# Patient Record
Sex: FEMALE | Race: WHITE | NOT HISPANIC OR LATINO | Employment: FULL TIME | URBAN - NONMETROPOLITAN AREA
[De-identification: names, ages, dates, MRNs, and addresses within clinical notes are randomized per-mention and may not be internally consistent; named-entity substitution may affect disease eponyms.]

---

## 2022-02-20 ENCOUNTER — APPOINTMENT (EMERGENCY)
Dept: RADIOLOGY | Facility: HOSPITAL | Age: 47
End: 2022-02-20
Payer: COMMERCIAL

## 2022-02-20 ENCOUNTER — APPOINTMENT (EMERGENCY)
Dept: CT IMAGING | Facility: HOSPITAL | Age: 47
End: 2022-02-20
Payer: COMMERCIAL

## 2022-02-20 ENCOUNTER — HOSPITAL ENCOUNTER (OUTPATIENT)
Facility: HOSPITAL | Age: 47
Setting detail: OBSERVATION
Discharge: HOME | End: 2022-02-22
Attending: INTERNAL MEDICINE | Admitting: FAMILY MEDICINE
Payer: COMMERCIAL

## 2022-02-20 DIAGNOSIS — R42 VERTIGO: Primary | ICD-10-CM

## 2022-02-20 DIAGNOSIS — I16.0 HYPERTENSIVE URGENCY: ICD-10-CM

## 2022-02-20 LAB
ALBUMIN SERPL BCP-MCNC: 3.6 G/DL (ref 3.5–5)
ALP SERPL-CCNC: 61 U/L (ref 46–116)
ALT SERPL W P-5'-P-CCNC: 29 U/L (ref 12–78)
ANION GAP SERPL CALCULATED.3IONS-SCNC: 3 MMOL/L (ref 4–13)
AST SERPL W P-5'-P-CCNC: 11 U/L (ref 5–45)
BASOPHILS # BLD AUTO: 0.02 THOUSANDS/ΜL (ref 0–0.1)
BASOPHILS NFR BLD AUTO: 0 % (ref 0–1)
BILIRUB SERPL-MCNC: 0.27 MG/DL (ref 0.2–1)
BUN SERPL-MCNC: 23 MG/DL (ref 5–25)
CALCIUM SERPL-MCNC: 8.5 MG/DL (ref 8.3–10.1)
CARDIAC TROPONIN I PNL SERPL HS: <2 NG/L
CHLORIDE SERPL-SCNC: 101 MMOL/L (ref 100–108)
CO2 SERPL-SCNC: 27 MMOL/L (ref 21–32)
CREAT SERPL-MCNC: 0.92 MG/DL (ref 0.6–1.3)
EOSINOPHIL # BLD AUTO: 0.04 THOUSAND/ΜL (ref 0–0.61)
EOSINOPHIL NFR BLD AUTO: 0 % (ref 0–6)
ERYTHROCYTE [DISTWIDTH] IN BLOOD BY AUTOMATED COUNT: 13 % (ref 11.6–15.1)
GFR SERPL CREATININE-BSD FRML MDRD: 74 ML/MIN/1.73SQ M
GLUCOSE SERPL-MCNC: 129 MG/DL (ref 65–140)
HCG SERPL QL: NEGATIVE
HCT VFR BLD AUTO: 43.8 % (ref 34.8–46.1)
HGB BLD-MCNC: 14.5 G/DL (ref 11.5–15.4)
IMM GRANULOCYTES # BLD AUTO: 0.07 THOUSAND/UL (ref 0–0.2)
IMM GRANULOCYTES NFR BLD AUTO: 1 % (ref 0–2)
LYMPHOCYTES # BLD AUTO: 2.04 THOUSANDS/ΜL (ref 0.6–4.47)
LYMPHOCYTES NFR BLD AUTO: 17 % (ref 14–44)
MAGNESIUM SERPL-MCNC: 2.1 MG/DL (ref 1.6–2.6)
MCH RBC QN AUTO: 29.8 PG (ref 26.8–34.3)
MCHC RBC AUTO-ENTMCNC: 33.1 G/DL (ref 31.4–37.4)
MCV RBC AUTO: 90 FL (ref 82–98)
MONOCYTES # BLD AUTO: 0.88 THOUSAND/ΜL (ref 0.17–1.22)
MONOCYTES NFR BLD AUTO: 7 % (ref 4–12)
NEUTROPHILS # BLD AUTO: 9 THOUSANDS/ΜL (ref 1.85–7.62)
NEUTS SEG NFR BLD AUTO: 75 % (ref 43–75)
NRBC BLD AUTO-RTO: 0 /100 WBCS
PLATELET # BLD AUTO: 223 THOUSANDS/UL (ref 149–390)
PMV BLD AUTO: 10.9 FL (ref 8.9–12.7)
POTASSIUM SERPL-SCNC: 4.5 MMOL/L (ref 3.5–5.3)
PROT SERPL-MCNC: 7.4 G/DL (ref 6.4–8.2)
RBC # BLD AUTO: 4.86 MILLION/UL (ref 3.81–5.12)
SODIUM SERPL-SCNC: 131 MMOL/L (ref 136–145)
WBC # BLD AUTO: 12.05 THOUSAND/UL (ref 4.31–10.16)

## 2022-02-20 PROCEDURE — 80053 COMPREHEN METABOLIC PANEL: CPT | Performed by: PHYSICIAN ASSISTANT

## 2022-02-20 PROCEDURE — 71045 X-RAY EXAM CHEST 1 VIEW: CPT

## 2022-02-20 PROCEDURE — 99285 EMERGENCY DEPT VISIT HI MDM: CPT

## 2022-02-20 PROCEDURE — 93005 ELECTROCARDIOGRAM TRACING: CPT

## 2022-02-20 PROCEDURE — 83735 ASSAY OF MAGNESIUM: CPT | Performed by: PHYSICIAN ASSISTANT

## 2022-02-20 PROCEDURE — 84703 CHORIONIC GONADOTROPIN ASSAY: CPT | Performed by: PHYSICIAN ASSISTANT

## 2022-02-20 PROCEDURE — 99285 EMERGENCY DEPT VISIT HI MDM: CPT | Performed by: PHYSICIAN ASSISTANT

## 2022-02-20 PROCEDURE — 96374 THER/PROPH/DIAG INJ IV PUSH: CPT

## 2022-02-20 PROCEDURE — 85025 COMPLETE CBC W/AUTO DIFF WBC: CPT | Performed by: PHYSICIAN ASSISTANT

## 2022-02-20 PROCEDURE — 96361 HYDRATE IV INFUSION ADD-ON: CPT

## 2022-02-20 PROCEDURE — 70450 CT HEAD/BRAIN W/O DYE: CPT

## 2022-02-20 PROCEDURE — 84484 ASSAY OF TROPONIN QUANT: CPT | Performed by: PHYSICIAN ASSISTANT

## 2022-02-20 PROCEDURE — 36415 COLL VENOUS BLD VENIPUNCTURE: CPT | Performed by: PHYSICIAN ASSISTANT

## 2022-02-20 RX ORDER — DIAZEPAM 5 MG/ML
5 INJECTION, SOLUTION INTRAMUSCULAR; INTRAVENOUS ONCE
Status: COMPLETED | OUTPATIENT
Start: 2022-02-20 | End: 2022-02-20

## 2022-02-20 RX ORDER — MECLIZINE HYDROCHLORIDE 25 MG/1
25 TABLET ORAL ONCE
Status: COMPLETED | OUTPATIENT
Start: 2022-02-20 | End: 2022-02-20

## 2022-02-20 RX ADMIN — SODIUM CHLORIDE 1000 ML: 0.9 INJECTION, SOLUTION INTRAVENOUS at 23:38

## 2022-02-20 RX ADMIN — MECLIZINE HYDROCHLORIDE 25 MG: 25 TABLET ORAL at 23:36

## 2022-02-20 RX ADMIN — DIAZEPAM 5 MG: 10 INJECTION, SOLUTION INTRAMUSCULAR; INTRAVENOUS at 23:36

## 2022-02-20 NOTE — Clinical Note
Oscar Easley was seen and treated in our emergency department on 2/20/2022  Diagnosis:     Terell Scott  is off the rest of the shift today, may return to work on return date  She may return on this date: 02/23/2022         If you have any questions or concerns, please don't hesitate to call        Paras Auguste PA-C    ______________________________           _______________          _______________  Hospital Representative                              Date                                Time

## 2022-02-21 PROBLEM — E66.813 CLASS 3 SEVERE OBESITY WITH SERIOUS COMORBIDITY AND BODY MASS INDEX (BMI) OF 40.0 TO 44.9 IN ADULT: Status: ACTIVE | Noted: 2022-02-21

## 2022-02-21 PROBLEM — E66.01 CLASS 3 SEVERE OBESITY WITH SERIOUS COMORBIDITY AND BODY MASS INDEX (BMI) OF 40.0 TO 44.9 IN ADULT (HCC): Status: ACTIVE | Noted: 2022-02-21

## 2022-02-21 PROBLEM — I16.9 HYPERTENSIVE CRISIS: Status: ACTIVE | Noted: 2022-02-21

## 2022-02-21 PROBLEM — R42 ACUTE ONSET OF SEVERE VERTIGO: Status: ACTIVE | Noted: 2022-02-21

## 2022-02-21 PROBLEM — M54.16 CHRONIC LUMBAR RADICULOPATHY: Status: ACTIVE | Noted: 2022-02-21

## 2022-02-21 LAB
ANION GAP SERPL CALCULATED.3IONS-SCNC: 10 MMOL/L (ref 4–13)
BUN SERPL-MCNC: 17 MG/DL (ref 5–25)
CALCIUM SERPL-MCNC: 8.1 MG/DL (ref 8.3–10.1)
CARDIAC TROPONIN I PNL SERPL HS: <2 NG/L (ref 8–18)
CHLORIDE SERPL-SCNC: 104 MMOL/L (ref 100–108)
CHOLEST SERPL-MCNC: 195 MG/DL
CO2 SERPL-SCNC: 25 MMOL/L (ref 21–32)
CREAT SERPL-MCNC: 0.77 MG/DL (ref 0.6–1.3)
ERYTHROCYTE [DISTWIDTH] IN BLOOD BY AUTOMATED COUNT: 12.9 % (ref 11.6–15.1)
GFR SERPL CREATININE-BSD FRML MDRD: 92 ML/MIN/1.73SQ M
GLUCOSE P FAST SERPL-MCNC: 120 MG/DL (ref 65–99)
GLUCOSE SERPL-MCNC: 120 MG/DL (ref 65–140)
HCT VFR BLD AUTO: 42.6 % (ref 34.8–46.1)
HDLC SERPL-MCNC: 51 MG/DL
HGB BLD-MCNC: 14.4 G/DL (ref 11.5–15.4)
LDLC SERPL CALC-MCNC: 110 MG/DL (ref 0–100)
MCH RBC QN AUTO: 30.1 PG (ref 26.8–34.3)
MCHC RBC AUTO-ENTMCNC: 33.8 G/DL (ref 31.4–37.4)
MCV RBC AUTO: 89 FL (ref 82–98)
PLATELET # BLD AUTO: 233 THOUSANDS/UL (ref 149–390)
PMV BLD AUTO: 10.7 FL (ref 8.9–12.7)
POTASSIUM SERPL-SCNC: 4.2 MMOL/L (ref 3.5–5.3)
RBC # BLD AUTO: 4.78 MILLION/UL (ref 3.81–5.12)
SODIUM SERPL-SCNC: 139 MMOL/L (ref 136–145)
TRIGL SERPL-MCNC: 168 MG/DL
TSH SERPL DL<=0.05 MIU/L-ACNC: 2.58 UIU/ML (ref 0.36–3.74)
WBC # BLD AUTO: 11.47 THOUSAND/UL (ref 4.31–10.16)

## 2022-02-21 PROCEDURE — 99219 PR INITIAL OBSERVATION CARE/DAY 50 MINUTES: CPT | Performed by: FAMILY MEDICINE

## 2022-02-21 PROCEDURE — 85027 COMPLETE CBC AUTOMATED: CPT | Performed by: NURSE PRACTITIONER

## 2022-02-21 PROCEDURE — 80048 BASIC METABOLIC PNL TOTAL CA: CPT | Performed by: NURSE PRACTITIONER

## 2022-02-21 PROCEDURE — 84484 ASSAY OF TROPONIN QUANT: CPT | Performed by: NURSE PRACTITIONER

## 2022-02-21 PROCEDURE — 80061 LIPID PANEL: CPT | Performed by: NURSE PRACTITIONER

## 2022-02-21 PROCEDURE — 36415 COLL VENOUS BLD VENIPUNCTURE: CPT | Performed by: NURSE PRACTITIONER

## 2022-02-21 PROCEDURE — 97163 PT EVAL HIGH COMPLEX 45 MIN: CPT

## 2022-02-21 PROCEDURE — 84443 ASSAY THYROID STIM HORMONE: CPT | Performed by: NURSE PRACTITIONER

## 2022-02-21 PROCEDURE — 96375 TX/PRO/DX INJ NEW DRUG ADDON: CPT

## 2022-02-21 RX ORDER — SODIUM CHLORIDE 9 MG/ML
100 INJECTION, SOLUTION INTRAVENOUS CONTINUOUS
Status: DISCONTINUED | OUTPATIENT
Start: 2022-02-21 | End: 2022-02-22

## 2022-02-21 RX ORDER — LABETALOL 20 MG/4 ML (5 MG/ML) INTRAVENOUS SYRINGE
10 EVERY 4 HOURS
Status: DISCONTINUED | OUTPATIENT
Start: 2022-02-21 | End: 2022-02-22 | Stop reason: HOSPADM

## 2022-02-21 RX ORDER — ACETAMINOPHEN 325 MG/1
650 TABLET ORAL EVERY 6 HOURS PRN
Status: DISCONTINUED | OUTPATIENT
Start: 2022-02-21 | End: 2022-02-22 | Stop reason: HOSPADM

## 2022-02-21 RX ORDER — FLUTICASONE PROPIONATE 50 MCG
1 SPRAY, SUSPENSION (ML) NASAL DAILY
COMMUNITY

## 2022-02-21 RX ORDER — TIZANIDINE 2 MG/1
4 TABLET ORAL EVERY 8 HOURS PRN
Status: DISCONTINUED | OUTPATIENT
Start: 2022-02-21 | End: 2022-02-22 | Stop reason: HOSPADM

## 2022-02-21 RX ORDER — MELOXICAM 15 MG/1
15 TABLET ORAL DAILY
COMMUNITY

## 2022-02-21 RX ORDER — MECLIZINE HYDROCHLORIDE 25 MG/1
25 TABLET ORAL EVERY 8 HOURS SCHEDULED
Status: DISCONTINUED | OUTPATIENT
Start: 2022-02-21 | End: 2022-02-22 | Stop reason: HOSPADM

## 2022-02-21 RX ORDER — TRAMADOL HYDROCHLORIDE 50 MG/1
50 TABLET ORAL EVERY 6 HOURS PRN
COMMUNITY

## 2022-02-21 RX ORDER — ONDANSETRON 2 MG/ML
4 INJECTION INTRAMUSCULAR; INTRAVENOUS EVERY 6 HOURS PRN
Status: DISCONTINUED | OUTPATIENT
Start: 2022-02-21 | End: 2022-02-22 | Stop reason: HOSPADM

## 2022-02-21 RX ORDER — ACETAMINOPHEN 325 MG/1
650 TABLET ORAL ONCE
Status: COMPLETED | OUTPATIENT
Start: 2022-02-21 | End: 2022-02-21

## 2022-02-21 RX ORDER — METOPROLOL TARTRATE 5 MG/5ML
5 INJECTION INTRAVENOUS ONCE
Status: COMPLETED | OUTPATIENT
Start: 2022-02-21 | End: 2022-02-21

## 2022-02-21 RX ORDER — CLONIDINE HYDROCHLORIDE 0.1 MG/1
0.2 TABLET ORAL EVERY 12 HOURS SCHEDULED
Status: DISCONTINUED | OUTPATIENT
Start: 2022-02-21 | End: 2022-02-22 | Stop reason: HOSPADM

## 2022-02-21 RX ORDER — ONDANSETRON 4 MG/1
4 TABLET, ORALLY DISINTEGRATING ORAL EVERY 6 HOURS PRN
Qty: 20 TABLET | Refills: 0 | Status: SHIPPED | OUTPATIENT
Start: 2022-02-21 | End: 2022-02-22 | Stop reason: HOSPADM

## 2022-02-21 RX ORDER — TIZANIDINE 2 MG/1
4 TABLET ORAL EVERY 8 HOURS PRN
COMMUNITY

## 2022-02-21 RX ORDER — ALPRAZOLAM 0.25 MG/1
0.25 TABLET ORAL 2 TIMES DAILY PRN
Status: DISCONTINUED | OUTPATIENT
Start: 2022-02-21 | End: 2022-02-22 | Stop reason: HOSPADM

## 2022-02-21 RX ORDER — SERTRALINE HYDROCHLORIDE 100 MG/1
100 TABLET, FILM COATED ORAL DAILY
Status: DISCONTINUED | OUTPATIENT
Start: 2022-02-21 | End: 2022-02-22 | Stop reason: HOSPADM

## 2022-02-21 RX ORDER — HYDRALAZINE HYDROCHLORIDE 20 MG/ML
5 INJECTION INTRAMUSCULAR; INTRAVENOUS ONCE
Status: COMPLETED | OUTPATIENT
Start: 2022-02-21 | End: 2022-02-21

## 2022-02-21 RX ORDER — MECLIZINE HYDROCHLORIDE 25 MG/1
25 TABLET ORAL 3 TIMES DAILY PRN
Qty: 30 TABLET | Refills: 0 | Status: SHIPPED | OUTPATIENT
Start: 2022-02-21 | End: 2022-02-22 | Stop reason: SDUPTHER

## 2022-02-21 RX ORDER — MELOXICAM 7.5 MG/1
15 TABLET ORAL DAILY
Status: DISCONTINUED | OUTPATIENT
Start: 2022-02-21 | End: 2022-02-22 | Stop reason: HOSPADM

## 2022-02-21 RX ORDER — ONDANSETRON 2 MG/ML
4 INJECTION INTRAMUSCULAR; INTRAVENOUS ONCE
Status: COMPLETED | OUTPATIENT
Start: 2022-02-21 | End: 2022-02-21

## 2022-02-21 RX ORDER — MELATONIN
1000 DAILY
Status: DISCONTINUED | OUTPATIENT
Start: 2022-02-21 | End: 2022-02-22 | Stop reason: HOSPADM

## 2022-02-21 RX ORDER — HYDRALAZINE HYDROCHLORIDE 20 MG/ML
10 INJECTION INTRAMUSCULAR; INTRAVENOUS EVERY 6 HOURS PRN
Status: DISCONTINUED | OUTPATIENT
Start: 2022-02-21 | End: 2022-02-22 | Stop reason: HOSPADM

## 2022-02-21 RX ORDER — TRAMADOL HYDROCHLORIDE 50 MG/1
50 TABLET ORAL EVERY 6 HOURS PRN
Status: DISCONTINUED | OUTPATIENT
Start: 2022-02-21 | End: 2022-02-22 | Stop reason: HOSPADM

## 2022-02-21 RX ORDER — SERTRALINE HYDROCHLORIDE 100 MG/1
100 TABLET, FILM COATED ORAL DAILY
COMMUNITY

## 2022-02-21 RX ADMIN — Medication 1000 UNITS: at 08:16

## 2022-02-21 RX ADMIN — MELOXICAM 15 MG: 7.5 TABLET ORAL at 08:16

## 2022-02-21 RX ADMIN — MECLIZINE HYDROCHLORIDE 25 MG: 25 TABLET ORAL at 14:02

## 2022-02-21 RX ADMIN — SERTRALINE HYDROCHLORIDE 100 MG: 100 TABLET ORAL at 08:16

## 2022-02-21 RX ADMIN — ONDANSETRON 4 MG: 2 INJECTION INTRAMUSCULAR; INTRAVENOUS at 02:11

## 2022-02-21 RX ADMIN — MECLIZINE HYDROCHLORIDE 25 MG: 25 TABLET ORAL at 08:16

## 2022-02-21 RX ADMIN — LABETALOL HYDROCHLORIDE 10 MG: 5 INJECTION, SOLUTION INTRAVENOUS at 15:44

## 2022-02-21 RX ADMIN — ACETAMINOPHEN 650 MG: 325 TABLET ORAL at 22:23

## 2022-02-21 RX ADMIN — METOPROLOL TARTRATE 5 MG: 5 INJECTION INTRAVENOUS at 05:10

## 2022-02-21 RX ADMIN — CLONIDINE HYDROCHLORIDE 0.2 MG: 0.1 TABLET ORAL at 17:27

## 2022-02-21 RX ADMIN — MECLIZINE HYDROCHLORIDE 25 MG: 25 TABLET ORAL at 22:14

## 2022-02-21 RX ADMIN — SODIUM CHLORIDE 100 ML/HR: 0.9 INJECTION, SOLUTION INTRAVENOUS at 22:17

## 2022-02-21 RX ADMIN — HYDRALAZINE HYDROCHLORIDE 10 MG: 20 INJECTION INTRAMUSCULAR; INTRAVENOUS at 14:02

## 2022-02-21 RX ADMIN — ACETAMINOPHEN 650 MG: 325 TABLET ORAL at 00:21

## 2022-02-21 RX ADMIN — LABETALOL HYDROCHLORIDE 10 MG: 5 INJECTION, SOLUTION INTRAVENOUS at 09:10

## 2022-02-21 RX ADMIN — ACETAMINOPHEN 650 MG: 325 TABLET ORAL at 16:01

## 2022-02-21 RX ADMIN — SODIUM CHLORIDE 100 ML/HR: 0.9 INJECTION, SOLUTION INTRAVENOUS at 02:12

## 2022-02-21 RX ADMIN — LABETALOL HYDROCHLORIDE 10 MG: 5 INJECTION, SOLUTION INTRAVENOUS at 19:42

## 2022-02-21 RX ADMIN — LABETALOL HYDROCHLORIDE 10 MG: 5 INJECTION, SOLUTION INTRAVENOUS at 12:42

## 2022-02-21 RX ADMIN — HYDRALAZINE HYDROCHLORIDE 5 MG: 20 INJECTION INTRAMUSCULAR; INTRAVENOUS at 00:23

## 2022-02-21 NOTE — ED NOTES
Pt amb to BR, denies dizziness, states "slight lightheaded feeling" on standing       Chris Bello RN  02/21/22 9417

## 2022-02-21 NOTE — ASSESSMENT & PLAN NOTE
· POA with acute onset of severe vertigo that began after patient was lying on her back on the floor looking up at the TV and she rolled onto her stomach in an attempt to get up but when she rolled onto her stomach she developed intense vertigo, nausea and diaphoresis  · History of vestibular dysfunction, vertigo at age 25 and was treated with vestibular therapy  · Over the past years she intermittently is dizzy upon getting up rapidly from a sitting position but these symptoms are transient and brief  · Neurological exam is nonfocal with exception of inability to walk in a straight line  · Symptoms are exacerbated by any movement  · CT brain no acute abnormality  · Treated with Antivert and diazepam in the ED without improvement  · Admit under observation  · Monitor neuro status  · Telemetry  · IV hydration  · Antivert q 8 hours  · PT evaluation

## 2022-02-21 NOTE — ASSESSMENT & PLAN NOTE
· POA with acute onset of severe vertigo that began after patient was lying on her back on the floor looking up at the TV and she rolled onto her stomach in an attempt to get up but when she rolled onto her stomach she developed intense vertigo, nausea and diaphoresis  · History of vestibular dysfunction, vertigo at age 25 and was treated with vestibular therapy  · Over the past years she intermittently is dizzy upon getting up rapidly from a sitting position but these symptoms are transient and brief  · Neurological exam is nonfocal with exception of inability to walk in a straight line  · Symptoms are exacerbated by any movement  · CT brain no acute abnormality  · Treated with Antivert and diazepam in the ED without improvement  · Clinically improving now  Continue Antivert on discharge

## 2022-02-21 NOTE — ASSESSMENT & PLAN NOTE
· Followed by pain management  · Recent epidural steroid injection 02/15/2022  · Continue PTA Mobic, Zanaflex, tramadol  · Outpatient follow-up

## 2022-02-21 NOTE — ED NOTES
Pt OOB to ambulate to the restroom, reports dizziness when she turns her head from side to side with mild nausea  Continues to report a dull frontal headache        Chloe Blas RN  02/21/22 5981

## 2022-02-21 NOTE — ASSESSMENT & PLAN NOTE
· /110 on ED admission  · CT brain no acute abnormality  · Blood pressure improved with hydralazine  · Patient has history of hypertension described as white coat and is not Rx antihypertensive medication  · In the past week patient underwent lumbar epidural injection by Pain Management on 02/15/2022 at OSH  · /90 preprocedure, 187/108 postprocedure  · Questionable reaction to epidural steroid injection  · Patient was started on clonidine on admission  Unable to discontinue it at this time due to rebound blood pressure issues  Will place on clonidine 0 1 mg b i d  And also placed on HCTZ 25 mg daily    Repeat blood work in 1 week and outpatient follow-up with PCP and see if clonidine may be gradually titrated down

## 2022-02-21 NOTE — PHYSICAL THERAPY NOTE
PHYSICAL THERAPY EVALUATION  NAME:  Ronnie Mejia  DATE: 02/21/22    AGE:   55 y o  Mrn:   [id number]  ADMIT DX:  Dizziness [R42]    Past Medical History:   Diagnosis Date    Anxiety     GERD (gastroesophageal reflux disease)     Herniated disc, cervical     Hypertension     Hypertriglyceridemia     Iron deficiency anemia     Vertigo     Vitamin D deficiency      Length Of Stay: 0  Performed at least 2 patient identifiers during session: Name and Birthday  PHYSICAL THERAPY EVALUATION :      02/21/22 0918   PT Last Visit   PT Visit Date 02/21/22   Note Type   Note type Evaluation   Pain Assessment   Pain Assessment Tool 0-10   Pain Score 3  (no increase in HA at end of session)   Pain Location/Orientation Location: Head   Restrictions/Precautions   Other Precautions Fall Risk   Home Living   Type of Home House  (no ANISH)   Home Layout Two level;Bed/bath upstairs;1/2 bath on main level  (FF R HR)   Bathroom Shower/Tub Tub/shower unit   Bathroom Toilet Standard   Bath[facility] Equipment [facility]   (no DME)   Additional Comments Reports living in a [facility] with no ANISH and FF with R HR to 2nd floor  not using an AD PTA  Prior Function   Level of [place] Independent with ADLs and functional mobility   Lives With Spouse  (children)   Receives Help From Family   ADL Assistance Independent   IADLs Independent  (+ Driving)   Falls in the last 6 months 0   Vocational Full time employment  (accounting)   Comments Reports being indepednent with mobility, ADLs and IADLs      Cognition   Orientation Level Oriented X4   Following Commands Follows all commands and directions without difficulty   Subjective   Subjective "I work from home"   RUE Assessment   RUE Assessment WFL   LUE Assessment   LUE Assessment WFL   RLE Assessment   RLE Assessment WFL  (4/5)   LLE Assessment   LLE Assessment WFL  (4/5)   Vestibular   Vestibular Comments head turning to left with + ageotrophic nystagmus, subsided within 1'  head turn to left with + ageotrophic nystagmus with increased frequency compared to head turn to left  subsided within 1'  pt with similar c/o dizziness for each direction  Completed [place] Maneuver to left with c/o dizziness upon laying on left side, then reolved within 1', reminaed on left side for 1', then turned head down toward bed-no c o dizziness  upon return to [facility], patient with slight c/o lightheadedness, no dizziness  no nystagmus noted with supine to sit or sit to supine and no c/o dizziness for supine<>sit EOB  head turns with eyes fixated foreward, no c/o dizziness  Coordination   Rapid Alternating Movements Intact   Light Touch   RLE Light Touch Grossly intact   LLE Light Touch Grossly intact   Bed Mobility   Supine to Sit 7  Independent   Sit to Supine 7  Independent   Additional Comments HOB flat without bedrail  independent without difficulty  Transfers   Sit to Stand 7  Independent   Stand to Sit 7  Independent   Stand pivot 7  Independent   Additional Comments no AD  independent for transfers  Ambulation/Elevation   Gait pattern Wide FLORENCE  (slight path deviation)   Gait Assistance 5  Supervision   Additional items Verbal cues   Assistive Device None   Distance 175'x1 without AD with wide FLORENCE, slight path deviaiton  pt with c/o "feeling woozy" no dizziness  completed head turns right and left with supervision without dizziness and cervical extension without dizziness, but slihgt dizziness with cervical flexion  Balance   Static Sitting Normal   Dynamic Sitting Normal   Static Standing Normal   Dynamic Standing Good   Ambulatory Fair +   Activity Tolerance   Activity Tolerance Patient tolerated treatment well   Nurse Made Aware Luci HERMAN   Assessment   Prognosis Good   Problem List Impaired balance;Decreased mobility;Obesity; Decreased strength   Barriers to Discharge None   Barriers to Discharge Comments has support from family   Goals   Patient Goals "Go home"   STG Expiration Date 03/07/22   PT Treatment Day 0   Plan   Treatment/Interventions Functional transfer training;LE strengthening/ROM; Elevations; Therapeutic exercise; Endurance training;Patient/family training;Equipment eval/education; Bed mobility;Gait training; Compensatory technique education;Spoke to nursing   PT Frequency 1-2x/wk   Recommendation   PT Discharge Recommendation Home with outpatient rehabilitation  (vestibular therapy)   Equipment Recommended   (none)   Additional Comments reviewed recommendation for outpatient PT services for further assessment of BPPV  discussed having someone drive her to appointments, limiting quick movements with head turns to decrease risk of falls with dizziness  pt verbalized understanding  AM-PAC Basic Mobility Inpatient   Turning in Bed Without Bedrails 4   Lying on Back to Sitting on Edge of Flat Bed 4   Moving Bed to Chair 4   Standing Up From Chair 4   Walk in Room 3   Climb 3-5 Stairs 3   Basic Mobility Inpatient Raw Score 22   Basic Mobility Standardized Score 47 4   Highest Level Of Mobility   JH-HLM Goal 7: Walk 25 feet or more   JH-HLM Highest Level of Mobility 7: Walk 25 feet or more   JH-HLM Goal Achieved Yes   End of Consult   Patient Position at End of Consult Supine; All needs within reach     Handout provided for [facility] vestibular therapy outpatient clinics in area at patient request         02/21/22 0915 02/21/22 0945   Vitals   Pulse 97 93   Respirations 19 20   Blood Pressure 166/93 (!) 172/104  (after ambulation; headache)   MAP (mmHg) 123 132   BP Location Left arm  --    Patient Position - Orthostatic VS Lying  --        (Please find full objective findings from PT assessment regarding body systems outlined above)  Assessment: Pt is a 55 y o  female seen for PT evaluation s/p admission to [address] [place] [address] on 2/20/2022 with Acute onset of severe vertigo  Order placed for PT services    Upon evaluation: Pt is presenting with impaired functional mobility due to decreased strength, impaired balance, gait deviations and fall risk requiring supervision assistance for ambulation with out AD  Pt's clinical presentation is currently unpredictable given the functional mobility deficits above, especially weakness, gait deviations and impaired vestibular-occular function, coupled with fall risks as indicated by AM-PAC 6-Clicks: 03/76 as well as impaired balance, polypharmacy and c/o lightheadedness/dizziness and combined with medical complications of hypertension , poor blood pressure control, abnormal WBCs and abnormal sodium values  Pt's PMHx and comorbidities that may affect physical performance and progress include: HTN, obesity and anxiety, vertigo, cervical herniated disc, GISSEL, chronic lumbar radiculopathy s/p steroid injection  Personal factors affecting pt at time of IE include: multi-level environment and inability to perform IADLs  Pt will benefit from continued skilled PT services to address deficits as defined above and to maximize level of functional mobility to facilitate return toward PLOF and improved QOL  From PT/mobility standpoint, recommendation at time of d/c would be vestibular therapy at OP PT pending progress in order to reduce fall risk and maximize pt's functional independence and consistency with mobility in order to facilitate return to PLOF  Recommend further assess BPPV  The patient's -[facility] Basic Mobility Inpatient Short Form Raw Score is 22  A Raw score of greater than 16 suggests the patient may benefit from discharge to home  Please also refer to the recommendation of the Physical Therapist for safe discharge planning  Goals: Pt will: Perform bed mobility tasks with independent to reposition in bed and prepare for transfers  Pt will perform transfers with independent to decrease risk for falls and prepare for ambulation   Pt will ambulate with out AD for >/= 500' with  independent  to decrease risk for falls, improve activity tolerance and improve gait quality and to access home environment  Pt will complete >/= 12 steps with with unilateral handrail with modified I to return to multilevel home  Pt will participate in objective balance assessment to determine baseline fall risk  Pt will increase B LE strength >/= 1/2 MMT grade to facilitate functional mobility  Pt will tolerate assessment/treatment of BPPV to decrease risk for falls        Ramírez Brenner, PT,DPT

## 2022-02-21 NOTE — H&P
[address]  H&P- Melissa Underwood 1975, 55 y o  female MRN: [id number]  Unit/Bed#: ED 07 Encounter: [id number]  Primary Care Provider: Selena Camp DO   Date and time admitted to hospital: 2/20/2022 10:43 PM    * Acute onset of severe vertigo  Assessment & Plan  · POA with acute onset of severe vertigo that began after patient was lying on her back on the floor looking up at the TV and she rolled onto her stomach in an attempt to get up but when she rolled onto her stomach she developed intense vertigo, nausea and diaphoresis  · History of vestibular dysfunction, vertigo at age 25 and was treated with vestibular therapy  · Over the past years she intermittently is dizzy upon getting up rapidly from a sitting position but these symptoms are transient and brief  · Neurological exam is nonfocal with exception of inability to walk in a straight line  · Symptoms are exacerbated by any movement  · CT brain no acute abnormality  · Treated with Antivert and diazepam in the ED without improvement  · Admit under observation  · Monitor neuro status  · Telemetry  · IV hydration  · Antivert q 8 hours  · PT evaluation    Class 3 severe obesity with serious comorbidity and body mass index (BMI) of 40 0 to 44 9 in adult [facility])  Assessment & Plan  · BMI 44 92  · Requires intensive lifestyle modification    Chronic lumbar radiculopathy  Assessment & Plan  · Followed by pain management  · Recent epidural steroid injection 02/15/2022  · Continue PTA Mobic, Zanaflex, tramadol  · Outpatient follow-up    Hypertensive crisis  Assessment & Plan  · /110 on ED admission  · CT brain no acute abnormality  · Blood pressure improved with hydralazine  · Patient has history of hypertension described as white coat and is not Rx antihypertensive medication  · In the past week patient underwent lumbar epidural injection by Pain Management on 02/15/2022 at OSH  · /90 preprocedure, 187/108 postprocedure  · Questionable reaction to epidural steroid injection  · Continue IV antihypertensives  · Consider oral antihypertensive with discharge and PCP follow-up    VTE Pharmacologic Prophylaxis: VTE Score: 4 Moderate Risk (Score 3-4) - Pharmacological DVT Prophylaxis Ordered: enoxaparin (Lovenox)  Code Status: Level 1 - Full Code   Discussion with family: Updated  () at bedside  Anticipated Length of Stay: Patient will be admitted on an observation basis with an anticipated length of stay of less than 2 midnights secondary to vertigo  Total Time for Visit, including Counseling / Coordination of Care: 30 minutes Greater than 50% of this total time spent on direct patient counseling and coordination of care  Chief Complaint:  Vertigo    History of Present Illness:  Christopher Malcolm is a 55 y o  female with a PMH of lumbar radiculopathy s/p recent epidural steroid injection 2/15, hypertension, obesity, anxiety, vertigo at age 25 treated with vestibular therapy  She presents today to the emergency department after lying on her back watching TV and after she rolled onto her stomach developed intense vertigo associated with diaphoresis and nausea  This is very similar to her current at age 25  She states over the years she has never had vertigo like she did at the time she required vestibular therapy but she does intermittently developed brief dizziness when standing up quickly  Cardiac enzymes and EKG were nonischemic in emergency department, CT brain no acute abnormality and patient is admitted to medical observation for intractable vertiginous symptoms  Patient also found to be profoundly hypertensive and does admit to white coat hypertension but is not prescribed antihypertensive medications    Thorough review of her Care everywhere, patient's blood pressures normally do range 140/90 but recently on 02/15 post epidural injection patient was documented to have a blood pressure 187/108  Neurological exam is nonfocal and symptoms are truly exacerbated by movement  Review of Systems:  Review of Systems   Constitutional: Negative for chills and fever  HENT: Negative for ear pain and sore throat  Eyes: Negative for pain and visual disturbance  Respiratory: Negative for cough and shortness of breath  Cardiovascular: Negative for chest pain and palpitations  Gastrointestinal: Positive for nausea  Negative for abdominal pain and vomiting  Genitourinary: Negative for dysuria and hematuria  Musculoskeletal: Positive for back pain and gait problem  Negative for arthralgias  Skin: Negative for color change and rash  Neurological: Positive for dizziness  Negative for seizures and syncope  All other systems reviewed and are negative  Past Medical and Surgical History:   Past Medical History:   Diagnosis Date    Anxiety     GERD (gastroesophageal reflux disease)     Herniated disc, cervical     Hypertension     Hypertriglyceridemia     Iron deficiency anemia     Vertigo     Vitamin D deficiency        Past Surgical History:   Procedure Laterality Date     SECTION      FRACTURE SURGERY      Left orbital ORIF    LUMBAR EPIDURAL INJECTION  02/15/2022       Meds/Allergies:  Prior to Admission medications    Medication Sig Start Date End Date Taking? Authorizing Provider   meclizine (ANTIVERT) 25 mg tablet Take 1 tablet (25 mg total) by mouth 3 (three) times a day as needed for dizziness 22   Dorota Armendariz PA-C   ondansetron (Zofran ODT) 4 mg disintegrating tablet Take 1 tablet (4 mg total) by mouth every 6 (six) hours as needed for nausea or vomiting 22   Dorota Armendariz PA-C     I have reviewed home medications with patient personally  Allergies:    Allergies   Allergen Reactions    Amoxicillin Hives    Penicillins Hives       Social History:  Marital Status: /Civil Union   Occupation:    Patient Pre-hospital Living Situation: With spouse  Patient Pre-hospital Level of Mobility: walks  Patient Pre-hospital Diet Restrictions:  None  Substance Use History:   Social History     Substance and Sexual Activity   Alcohol Use Not Currently     Social History     Tobacco Use   Smoking Status Never Smoker   Smokeless Tobacco Never Used     Social History     Substance and Sexual Activity   Drug Use Not Currently       Family History:  Family History   Problem Relation Age of Onset    Hypertension Mother     Heart disease Mother 36       Physical Exam:     Vitals:   Blood Pressure: (!) 176/89 (02/21/22 0500)  Pulse: 83 (02/21/22 0500)  Temperature: 98 °F (36 7 °C) (02/21/22 0217)  Temp Source: Oral (02/21/22 0217)  Respirations: 17 (02/21/22 0500)  Height: 5' 4" (162 6 cm) (02/21/22 0217)  Weight - Scale: 119 kg (261 lb 11 oz) (02/21/22 0217)  SpO2: 95 % (02/21/22 0500)    Physical Exam  Vitals and nursing note reviewed  Constitutional:       General: She is not in acute distress  Appearance: She is well-developed  She is obese  HENT:      Head: Normocephalic and atraumatic  Eyes:      Extraocular Movements:      Right eye: Nystagmus present  Left eye: Nystagmus present  Conjunctiva/sclera: Conjunctivae normal    Cardiovascular:      Rate and Rhythm: Normal rate and regular rhythm  Heart sounds: No murmur heard  Pulmonary:      Effort: Pulmonary effort is normal  No respiratory distress  Breath sounds: Normal breath sounds  Abdominal:      Palpations: Abdomen is soft  Tenderness: There is no abdominal tenderness  Musculoskeletal:         General: No swelling  Normal range of motion  Cervical back: Neck supple  Skin:     General: Skin is warm and dry  Capillary Refill: Capillary refill takes less than 2 seconds  Neurological:      General: No focal deficit present  Mental Status: She is alert and oriented to person, place, and time        Cranial Nerves: No cranial nerve deficit  Gait: Gait abnormal       Comments: Ataxic gait   Psychiatric:         Mood and Affect: Mood normal          Behavior: Behavior normal         Additional Data:     Lab Results:  Results from last 7 days   Lab Units 02/20/22  2314   WBC Thousand/uL 12 05*   HEMOGLOBIN g/dL 14 5   HEMATOCRIT % 43 8   PLATELETS Thousands/uL 223   NEUTROS PCT % 75   LYMPHS PCT % 17   MONOS PCT % 7   EOS PCT % 0     Results from last 7 days   Lab Units 02/20/22  2314   SODIUM mmol/L 131*   POTASSIUM mmol/L 4 5   CHLORIDE mmol/L 101   CO2 mmol/L 27   BUN mg/dL 23   CREATININE mg/dL 0 92   ANION GAP mmol/L 3*   CALCIUM mg/dL 8 5   ALBUMIN g/dL 3 6   TOTAL BILIRUBIN mg/dL 0 27   ALK PHOS U/L 61   ALT U/L 29   AST U/L 11   GLUCOSE RANDOM mg/dL 129                       Imaging: Reviewed radiology reports from this admission including: chest xray and CT head  CT head without contrast   Final Result by Ana Vera MD (02/21 0012)      No acute intracranial abnormality  Workstation performed: [id number]         XR chest 1 view portable   ED Interpretation by Homero Platt DO (02/21 0018)   No active disease          EKG and Other Studies Reviewed on Admission:   · EKG: NSR  HR Without ST elevation  ** Please Note: This note has been constructed using a voice recognition system   **

## 2022-02-21 NOTE — ASSESSMENT & PLAN NOTE
· /110 on ED admission  · CT brain no acute abnormality  · Blood pressure improved with hydralazine  · Patient has history of hypertension described as white coat and is not Rx antihypertensive medication  · In the past week patient underwent lumbar epidural injection by Pain Management on 02/15/2022 at OSH  · /90 preprocedure, 187/108 postprocedure  · Questionable reaction to epidural steroid injection  · Continue IV antihypertensives  · Consider oral antihypertensive with discharge and PCP follow-up

## 2022-02-21 NOTE — PLAN OF CARE
Problem: PHYSICAL THERAPY ADULT  Goal: Performs mobility at highest level of function for planned discharge setting  See evaluation for individualized goals  Description: Treatment/Interventions: Functional transfer training,LE strengthening/ROM,Elevations,Therapeutic exercise,Endurance training,Patient/family training,Equipment eval/education,Bed mobility,Gait training,Compensatory technique education,Spoke to nursing  Equipment Recommended:  (none)       See flowsheet documentation for full assessment, interventions and recommendations  Note: Prognosis: Good  Problem List: Impaired balance,Decreased mobility,Obesity,Decreased strength  Assessment: Pt is a 55 y o  female seen for PT evaluation s/p admission to [address] [place] [address] on 2/20/2022 with Acute onset of severe vertigo  Order placed for PT services  Upon evaluation: Pt is presenting with impaired functional mobility due to decreased strength, impaired balance, gait deviations and fall risk requiring supervision assistance for ambulation with out AD  Pt's clinical presentation is currently unpredictable given the functional mobility deficits above, especially weakness, gait deviations and impaired vestibular-occular function, coupled with fall risks as indicated by AM-PAC 6-Clicks: 44/71 as well as impaired balance, polypharmacy and c/o lightheadedness/dizziness and combined with medical complications of hypertension , poor blood pressure control, abnormal WBCs and abnormal sodium values  Pt's PMHx and comorbidities that may affect physical performance and progress include: HTN, obesity and anxiety, vertigo, cervical herniated disc, GISSEL, chronic lumbar radiculopathy s/p steroid injection  Personal factors affecting pt at time of IE include: multi-level environment and inability to perform IADLs   Pt will benefit from continued skilled PT services to address deficits as defined above and to maximize level of functional mobility to facilitate return toward PLOF and improved QOL  From PT/mobility standpoint, recommendation at time of d/c would be vestibular therapy at OP PT pending progress in order to reduce fall risk and maximize pt's functional independence and consistency with mobility in order to facilitate return to PLOF  Recommend further assess BPPV  Barriers to Discharge: None  Barriers to Discharge Comments: has support from family     PT Discharge Recommendation: Home with outpatient rehabilitation (vestibular therapy)          See flowsheet documentation for full assessment

## 2022-02-21 NOTE — PLAN OF CARE
Problem: MOBILITY - ADULT  Goal: Maintain or return to baseline ADL function  Description: INTERVENTIONS:  -  Assess patient's ability to carry out ADLs; assess patient's baseline for ADL function and identify physical deficits which impact ability to perform ADLs (bathing, care of mouth/teeth, toileting, grooming, dressing, etc )  - Assess/evaluate cause of self-care deficits   - Assess range of motion  - Assess patient's mobility; develop plan if impaired  - Assess patient's need for assistive devices and provide as appropriate  - Encourage maximum independence but intervene and supervise when necessary  - Involve family in performance of ADLs  - Assess for home care needs following discharge   - Consider OT consult to assist with ADL evaluation and planning for discharge  - Provide patient education as appropriate  Outcome: Progressing  Goal: Maintains/Returns to pre admission functional level  Description: INTERVENTIONS:  - Perform BMAT or MOVE assessment daily    - Set and communicate daily mobility goal to care team and patient/family/caregiver  - Collaborate with rehabilitation services on mobility goals if consulted  - Perform Range of Motion   times a day  - Reposition patient every   hours  - Dangle patient   times a day  - Stand patient   times a day  - Ambulate patient   times a day  - Out of bed to chair   times a day   - Out of bed for meals    times a day  - Out of bed for toileting  - Record patient progress and toleration of activity level   Outcome: Progressing     Problem: Potential for Falls  Goal: Patient will remain free of falls  Description: INTERVENTIONS:  - Educate patient/family on patient safety including physical limitations  - Instruct patient to call for assistance with activity   - Consult OT/PT to assist with strengthening/mobility   - Keep Call bell within reach  - Keep bed low and locked with side rails adjusted as appropriate  - Keep care items and personal belongings within reach  - Initiate and maintain comfort rounds  - Make Fall Risk Sign visible to staff  - Offer Toileting every   Hours, in advance of need  - Initiate/Maintain   alarm  - Obtain necessary fall risk management equipment:   - Apply yellow socks and bracelet for high fall risk patients  - Consider moving patient to room near nurses station  Outcome: Progressing     Problem: NEUROSENSORY - ADULT  Goal: Achieves stable or improved neurological status  Description: INTERVENTIONS  - Monitor and report changes in neurological status  - Monitor vital signs such as temperature, blood pressure, glucose, and any other labs ordered   - Initiate measures to prevent increased intracranial pressure  - Monitor for seizure activity and implement precautions if appropriate      Outcome: Progressing  Goal: Remains free of injury related to seizures activity  Description: INTERVENTIONS  - Maintain airway, patient safety  and administer oxygen as ordered  - Monitor patient for seizure activity, document and report duration and description of seizure to physician/advanced practitioner  - If seizure occurs,  ensure patient safety during seizure  - Reorient patient post seizure  - Seizure pads on all 4 side rails  - Instruct patient/family to notify RN of any seizure activity including if an aura is experienced  - Instruct patient/family to call for assistance with activity based on nursing assessment  - Administer anti-seizure medications if ordered    Outcome: Progressing  Goal: Achieves maximal functionality and self care  Description: INTERVENTIONS  - Monitor swallowing and airway patency with patient fatigue and changes in neurological status  - Encourage and assist patient to increase activity and self care     - Encourage visually impaired, hearing impaired and aphasic patients to use assistive/communication devices  Outcome: Progressing     Problem: PAIN - ADULT  Goal: Verbalizes/displays adequate comfort level or baseline comfort level  Description: Interventions:  - Encourage patient to monitor pain and request assistance  - Assess pain using appropriate pain scale  - Administer analgesics based on type and severity of pain and evaluate response  - Implement non-pharmacological measures as appropriate and evaluate response  - Consider cultural and social influences on pain and pain management  - Notify physician/advanced practitioner if interventions unsuccessful or patient reports new pain  Outcome: Progressing     Problem: INFECTION - ADULT  Goal: Absence or prevention of progression during hospitalization  Description: INTERVENTIONS:  - Assess and monitor for signs and symptoms of infection  - Monitor lab/diagnostic results  - Monitor all insertion sites, i e  indwelling lines, tubes, and drains  - Monitor endotracheal if appropriate and nasal secretions for changes in amount and color  - [place] appropriate cooling/warming therapies per order  - Administer medications as ordered  - Instruct and encourage patient and family to use good hand hygiene technique  - Identify and instruct in appropriate isolation precautions for identified infection/condition  Outcome: Progressing  Goal: Absence of fever/infection during neutropenic period  Description: INTERVENTIONS:  - Monitor WBC    Outcome: Progressing     Problem: SAFETY ADULT  Goal: Maintain or return to baseline ADL function  Description: INTERVENTIONS:  -  Assess patient's ability to carry out ADLs; assess patient's baseline for ADL function and identify physical deficits which impact ability to perform ADLs (bathing, care of mouth/teeth, toileting, grooming, dressing, etc )  - Assess/evaluate cause of self-care deficits   - Assess range of motion  - Assess patient's mobility; develop plan if impaired  - Assess patient's need for assistive devices and provide as appropriate  - Encourage maximum independence but intervene and supervise when necessary  - Involve family in performance of ADLs  - Assess for home care needs following discharge   - Consider OT consult to assist with ADL evaluation and planning for discharge  - Provide patient education as appropriate  Outcome: Progressing  Goal: Maintains/Returns to pre admission functional level  Description: INTERVENTIONS:  - Perform BMAT or MOVE assessment daily    - Set and communicate daily mobility goal to care team and patient/family/caregiver  - Collaborate with rehabilitation services on mobility goals if consulted  - Perform Range of Motion   times a day  - Reposition patient every   hours  - Dangle patient   times a day  - Stand patient   times a day  - Ambulate patient   times a day  - Out of bed to chair   times a day   - Out of bed for meals     times a day  - Out of bed for toileting  - Record patient progress and toleration of activity level   Outcome: Progressing  Goal: Patient will remain free of falls  Description: INTERVENTIONS:  - Educate patient/family on patient safety including physical limitations  - Instruct patient to call for assistance with activity   - Consult OT/PT to assist with strengthening/mobility   - Keep Call bell within reach  - Keep bed low and locked with side rails adjusted as appropriate  - Keep care items and personal belongings within reach  - Initiate and maintain comfort rounds  - Make Fall Risk Sign visible to staff  - Offer Toileting every   Hours, in advance of need  - Initiate/Maintain   alarm  - Obtain necessary fall risk management equipment:     - Apply yellow socks and bracelet for high fall risk patients  - Consider moving patient to room near nurses station  Outcome: Progressing     Problem: DISCHARGE PLANNING  Goal: Discharge to home or other facility with appropriate resources  Description: INTERVENTIONS:  - Identify barriers to discharge w/patient and caregiver  - Arrange for needed discharge resources and transportation as appropriate  - Identify discharge learning needs (meds, wound care, etc )  - Arrange for interpretive services to assist at discharge as needed  - Refer to Case Management Department for coordinating discharge planning if the patient needs post-hospital services based on physician/advanced practitioner order or complex needs related to functional status, cognitive ability, or social support system  Outcome: Progressing     Problem: Knowledge Deficit  Goal: Patient/family/caregiver demonstrates understanding of disease process, treatment plan, medications, and discharge instructions  Description: Complete learning assessment and assess knowledge base    Interventions:  - Provide teaching at level of understanding  - Provide teaching via preferred learning methods  Outcome: Progressing

## 2022-02-21 NOTE — ED PROVIDER NOTES
History  Chief Complaint   Patient presents with    Dizziness     dizziness started approx 30 min PTA, was laying on the floor watching TV, rolled over to get up and felt dizzy, became diaphoretic and nauseated  Hx of vertigo in the past       The patient is a 51-year-old female who presents emergency department today via EMS from home for the concern of dizziness  The patient states that prior to arrival she got up from sitting on the floor, and when she did she began feeling dizzy  She states it was a spinning sensation consistent with previous vertigo episodes  Patient states that she then felt nauseated  Patient denies any head injury or recent cold-like symptoms  She denies any current headache, blurred vision, shortness breath chest pain  Patient in route was given Versed as well as Zofran  Patient's dizziness has subsided  Patient was found to be hypertensive  She states that she has had no previous diagnosis of HTN  But has a + FH of it  Denies smoking  She was concerned about her blood pressure with her dizziness  The patient states that she does have a past medical history of vertigo and her dizziness today was similar to previous episodes  The patient denies any shortness of breath or chest pain, cough abdominal, back pain neck pain, numbness or tingling or extremity weakness        Dizziness  Quality:  Vertigo  Severity:  Moderate  Onset quality:  Unable to specify  Timing:  Constant  Progression:  Partially resolved  Chronicity:  New  Context: head movement and standing up    Relieved by:  Nothing  Worsened by:  Eye movement and movement  Ineffective treatments:  Being still  Associated symptoms: nausea    Associated symptoms: no blood in stool, no chest pain, no headaches, no hearing loss, no syncope, no tinnitus and no weakness    Nausea:     Severity:  Mild    Onset quality:  Unable to specify    Progression:  Resolved  Risk factors: hx of vertigo        None       Past Medical History: Diagnosis Date    Anxiety     Herniated disc, cervical     Hypertension     Vertigo        History reviewed  No pertinent surgical history  History reviewed  No pertinent family history  I have reviewed and agree with the history as documented  E-Cigarette/Vaping    E-Cigarette Use Never User      E-Cigarette/Vaping Substances     Social History     Tobacco Use    Smoking status: Never Smoker    Smokeless tobacco: Never Used   Vaping Use    Vaping Use: Never used   Substance Use Topics    Alcohol use: Not Currently    Drug use: Not Currently       Review of Systems   HENT: Negative for hearing loss and tinnitus  Cardiovascular: Negative for chest pain and syncope  Gastrointestinal: Positive for nausea  Negative for blood in stool  Neurological: Positive for dizziness  Negative for weakness and headaches  All other systems reviewed and are negative  Physical Exam  Physical Exam  Vitals and nursing note reviewed  Constitutional:       General: She is not in acute distress  Appearance: She is well-developed  HENT:      Head: Normocephalic and atraumatic  Right Ear: Tympanic membrane and ear canal normal       Left Ear: Tympanic membrane and ear canal normal    Eyes:      General: Vision grossly intact  Extraocular Movements: Extraocular movements intact  Right eye: Nystagmus present  Left eye: Nystagmus present  Conjunctiva/sclera: Conjunctivae normal       Pupils: Pupils are equal, round, and reactive to light  Comments: Lateral nystagmus   Cardiovascular:      Rate and Rhythm: Normal rate and regular rhythm  Pulses: Normal pulses  Heart sounds: No murmur heard  Pulmonary:      Effort: Pulmonary effort is normal  No respiratory distress  Breath sounds: Normal breath sounds  Abdominal:      Palpations: Abdomen is soft  Tenderness: There is no abdominal tenderness  There is no right CVA tenderness or left CVA tenderness  Musculoskeletal:      Cervical back: Normal range of motion and neck supple  Right lower leg: No edema  Left lower leg: No edema  Skin:     General: Skin is warm and dry  Capillary Refill: Capillary refill takes less than 2 seconds  Neurological:      General: No focal deficit present  Mental Status: She is alert and oriented to person, place, and time  GCS: GCS eye subscore is 4  GCS verbal subscore is 5  GCS motor subscore is 6  Gait: Gait normal       Comments: The patient has reproduction of dizziness with head movement           Vital Signs  ED Triage Vitals [02/20/22 2249]   Temperature Pulse Respirations Blood Pressure SpO2   98 1 °F (36 7 °C) 86 16 (!) 220/110 99 %      Temp Source Heart Rate Source Patient Position - Orthostatic VS BP Location FiO2 (%)   Oral Monitor Lying Left arm --      Pain Score       --           Vitals:    02/20/22 2249 02/21/22 0005   BP: (!) 220/110 (!) 183/107   Pulse: 86    Patient Position - Orthostatic VS: Lying          Visual Acuity      ED Medications  Medications   sodium chloride 0 9 % bolus 1,000 mL (1,000 mL Intravenous New Bag 2/20/22 2338)   hydrALAZINE (APRESOLINE) injection 5 mg (has no administration in time range)   acetaminophen (TYLENOL) tablet 650 mg (has no administration in time range)   meclizine (ANTIVERT) tablet 25 mg (25 mg Oral Given 2/20/22 2336)   diazepam (VALIUM) injection 5 mg (5 mg Intravenous Given 2/20/22 2336)       Diagnostic Studies  Results Reviewed     Procedure Component Value Units Date/Time    HS Troponin 0hr (reflex protocol) [[id number]]  (Normal) Collected: 02/20/22 2314    Lab Status: Final result Specimen: Blood from Hand, Right Updated: 02/20/22 2350     hs TnI 0hr <2 ng/L     Comprehensive metabolic panel [[id number]]  (Abnormal) Collected: 02/20/22 2314    Lab Status: Final result Specimen: Blood from Hand, Right Updated: 02/20/22 2348     Sodium 131 mmol/L      Potassium 4 5 mmol/L      Chloride 101 mmol/L      CO2 27 mmol/L      ANION GAP 3 mmol/L      BUN 23 mg/dL      Creatinine 0 92 mg/dL      Glucose 129 mg/dL      Calcium 8 5 mg/dL      AST 11 U/L      ALT 29 U/L      Alkaline Phosphatase 61 U/L      Total Protein 7 4 g/dL      Albumin 3 6 g/dL      Total Bilirubin 0 27 mg/dL      eGFR 74 ml/min/1 73sq m     Narrative:      National Kid[employer] guidelines for Chronic Kidney Disease (CKD):     Stage 1 with normal or high GFR (GFR > 90 mL/min/1 73 square meters)    Stage 2 Mild CKD (GFR = 60-89 mL/min/1 73 square meters)    Stage 3A Moderate CKD (GFR = 45-59 mL/min/1 73 square meters)    Stage 3B Moderate CKD (GFR = 30-44 mL/min/1 73 square meters)    Stage 4 Severe CKD (GFR = 15-29 mL/min/1 73 square meters)    Stage 5 End Stage CKD (GFR <15 mL/min/1 73 square meters)  Note: GFR calculation is accurate only with a steady state creatinine    Magnesium [[id number]]  (Normal) Collected: 02/20/22 2314    Lab Status: Final result Specimen: Blood from Hand, Right Updated: 02/20/22 2348     Magnesium 2 1 mg/dL     hCG, qualitative pregnancy [[id number]]  (Normal) Collected: 02/20/22 2314    Lab Status: Final result Specimen: Blood from Hand, Right Updated: 02/20/22 2348     Preg, Serum Negative    CBC and differential [[id number]]  (Abnormal) Collected: 02/20/22 2314    Lab Status: Final result Specimen: Blood from Hand, Right Updated: 02/20/22 2323     WBC 12 05 Thousand/uL      RBC 4 86 Million/uL      Hemoglobin 14 5 g/dL      Hematocrit 43 8 %      MCV 90 fL      MCH 29 8 pg      MCHC 33 1 g/dL      RDW 13 0 %      MPV 10 9 fL      Platelets 522 Thousands/uL      nRBC 0 /100 WBCs      Neutrophils Relative 75 %      Immat GRANS % 1 %      Lymphocytes Relative 17 %      Monocytes Relative 7 %      Eosinophils Relative 0 %      Basophils Relative 0 %      Neutrophils Absolute 9 00 Thousands/µL      Immature Grans Absolute 0 07 Thousand/uL      Lymphocytes Absolute 2 04 Thousands/µL Monocytes Absolute 0 88 Thousand/µL      Eosinophils Absolute 0 04 Thousand/µL      Basophils Absolute 0 02 Thousands/µL                  XR chest 1 view portable    (Results Pending)   CT head without contrast    (Results Pending)              Procedures  ECG 12 Lead Documentation Only    Date/Time: 2/20/2022 11:57 PM  Performed by: Ember Nguyen PA-C  Authorized by: Ember Nguyen PA-C     Indications / Diagnosis:  Dizziness  ECG reviewed by me, the ED Provider: yes    Patient location:  ED  Previous ECG:     Previous ECG:  Unavailable  Interpretation:     Interpretation: normal    Rate:     ECG rate:  86    ECG rate assessment: normal    Rhythm:     Rhythm: sinus rhythm    ST segments:     ST segments:  Normal  T waves:     T waves: normal               ED Course  ED Course as of 02/21/22 0012   Sun Feb 20, 2022   2358 hs TnI 0hr: <2                                             MDM  Number of Diagnoses or Management Options     Amount and/or Complexity of Data Reviewed  Clinical lab tests: ordered and reviewed  Tests in the radiology section of CPT®: ordered and reviewed  Decide to obtain previous medical records or to obtain history from someone other than the patient: yes  Obtain history from someone other than the patient: yes  Review and summarize past medical records: yes  Independent visualization of images, tracings, or specimens: yes    Risk of Complications, Morbidity, and/or Mortality  Presenting problems: moderate  Diagnostic procedures: moderate  Management options: moderate    Patient Progress  Patient progress: improved      Disposition  Final diagnoses:   Vertigo   Hypertensive urgency     Time reflects when diagnosis was documented in both MDM as applicable and the Disposition within this note     Time User Action Codes Description Comment    2/21/2022 12:07 AM Jayce Victoria Add [R42] Vertigo     2/21/2022 12:07 AM Jayce Victoria Add [I16 0] Hypertensive urgency       ED Disposition None      Follow-up Information     Follow up With Specialties Details Why Jan Whitten 53, DO Family Medicine Schedule an appointment as soon as possible for a visit   [address] / [place] [address]  [phone]            Patient's Medications   Discharge Prescriptions    MECLIZINE (ANTIVERT) 25 MG TABLET    Take 1 tablet (25 mg total) by mouth 3 (three) times a day as needed for dizziness       Start Date: 2/21/2022 End Date: --       Order Dose: 25 mg       Quantity: 30 tablet    Refills: 0    ONDANSETRON (ZOFRAN ODT) 4 MG DISINTEGRATING TABLET    Take 1 tablet (4 mg total) by mouth every 6 (six) hours as needed for nausea or vomiting       Start Date: 2/21/2022 End Date: --       Order Dose: 4 mg       Quantity: 20 tablet    Refills: 0       No discharge procedures on file      PDMP Review     None          ED Provider  Electronically Signed by           Chey Salgado PA-C  02/21/22 8643

## 2022-02-22 ENCOUNTER — APPOINTMENT (OUTPATIENT)
Dept: NON INVASIVE DIAGNOSTICS | Facility: HOSPITAL | Age: 47
End: 2022-02-22
Payer: COMMERCIAL

## 2022-02-22 VITALS
BODY MASS INDEX: 44.56 KG/M2 | DIASTOLIC BLOOD PRESSURE: 86 MMHG | RESPIRATION RATE: 18 BRPM | SYSTOLIC BLOOD PRESSURE: 150 MMHG | WEIGHT: 261 LBS | HEIGHT: 64 IN | OXYGEN SATURATION: 95 % | TEMPERATURE: 98 F | HEART RATE: 98 BPM

## 2022-02-22 LAB
AORTIC ROOT: 3 CM
APICAL FOUR CHAMBER EJECTION FRACTION: 71 %
ATRIAL RATE: 86 BPM
E WAVE DECELERATION TIME: 156 MS
E/A RATIO: 0.72
FRACTIONAL SHORTENING: 54 (ref 28–44)
INTERVENTRICULAR SEPTUM IN DIASTOLE (PARASTERNAL SHORT AXIS VIEW): 1 CM (ref 0.57–1.07)
INTERVENTRICULAR SEPTUM: 1 CM (ref 0.6–1.1)
LAAS-AP2: 10.5 CM2
LAAS-AP4: 11.6 CM2
LEFT ATRIUM SIZE: 3 CM
LEFT ATRIUM VOLUME INDEX (MOD BIPLANE): 13.7
LEFT INTERNAL DIMENSION IN SYSTOLE: 2.1 CM (ref 5.1–7.73)
LEFT VENTRICULAR INTERNAL DIMENSION IN DIASTOLE: 4.6 CM (ref 8.57–12.78)
LEFT VENTRICULAR POSTERIOR WALL IN END DIASTOLE: 1 CM (ref 0.56–1.06)
LEFT VENTRICULAR STROKE VOLUME: 80 ML
LVSV (TEICH): 80 ML
MV E'TISSUE VEL-LAT: 10 CM/S
MV E'TISSUE VEL-SEP: 13 CM/S
MV PEAK A VEL: 1.08 M/S
MV PEAK E VEL: 78 CM/S
MV STENOSIS PRESSURE HALF TIME: 45 MS
MV VALVE AREA P 1/2 METHOD: 4.9
P AXIS: 63 DEGREES
PR INTERVAL: 154 MS
QRS AXIS: 44 DEGREES
QRSD INTERVAL: 72 MS
QT INTERVAL: 384 MS
QTC INTERVAL: 459 MS
RIGHT ATRIAL 2D VOLUME: 15 ML
RIGHT ATRIUM AREA SYSTOLE A4C: 8.6 CM2
RIGHT VENTRICLE ID DIMENSION: 2.6 CM
SL CV LEFT ATRIUM LENGTH A2C: 4.6 CM
SL CV LV EF: 70
SL CV PED ECHO LEFT VENTRICLE DIASTOLIC VOLUME (MOD BIPLANE) 2D: 95 ML
SL CV PED ECHO LEFT VENTRICLE SYSTOLIC VOLUME (MOD BIPLANE) 2D: 15 ML
T WAVE AXIS: 33 DEGREES
VENTRICULAR RATE: 86 BPM
Z-SCORE OF INTERVENTRICULAR SEPTUM IN END DIASTOLE: 1.41

## 2022-02-22 PROCEDURE — 93306 TTE W/DOPPLER COMPLETE: CPT

## 2022-02-22 PROCEDURE — 99217 PR OBSERVATION CARE DISCHARGE MANAGEMENT: CPT | Performed by: FAMILY MEDICINE

## 2022-02-22 RX ORDER — HYDROCHLOROTHIAZIDE 25 MG/1
25 TABLET ORAL DAILY
Status: DISCONTINUED | OUTPATIENT
Start: 2022-02-22 | End: 2022-02-22 | Stop reason: HOSPADM

## 2022-02-22 RX ORDER — HYDROCHLOROTHIAZIDE 12.5 MG/1
12.5 TABLET ORAL DAILY
Status: DISCONTINUED | OUTPATIENT
Start: 2022-02-22 | End: 2022-02-22

## 2022-02-22 RX ORDER — HYDROCHLOROTHIAZIDE 25 MG/1
25 TABLET ORAL DAILY
Qty: 30 TABLET | Refills: 0 | Status: SHIPPED | OUTPATIENT
Start: 2022-02-23 | End: 2022-02-22

## 2022-02-22 RX ORDER — CLONIDINE HYDROCHLORIDE 0.1 MG/1
0.1 TABLET ORAL EVERY 12 HOURS SCHEDULED
Qty: 60 TABLET | Refills: 0 | Status: SHIPPED | OUTPATIENT
Start: 2022-02-22 | End: 2022-03-24

## 2022-02-22 RX ORDER — CLONIDINE HYDROCHLORIDE 0.1 MG/1
0.1 TABLET ORAL EVERY 12 HOURS SCHEDULED
Qty: 60 TABLET | Refills: 0 | Status: SHIPPED | OUTPATIENT
Start: 2022-02-22 | End: 2022-02-22

## 2022-02-22 RX ORDER — MECLIZINE HYDROCHLORIDE 25 MG/1
25 TABLET ORAL 3 TIMES DAILY PRN
Qty: 30 TABLET | Refills: 0 | Status: SHIPPED | OUTPATIENT
Start: 2022-02-22 | End: 2022-03-24

## 2022-02-22 RX ORDER — HYDROCHLOROTHIAZIDE 25 MG/1
25 TABLET ORAL DAILY
Qty: 30 TABLET | Refills: 0 | Status: SHIPPED | OUTPATIENT
Start: 2022-02-23 | End: 2022-03-25

## 2022-02-22 RX ADMIN — MECLIZINE HYDROCHLORIDE 25 MG: 25 TABLET ORAL at 05:32

## 2022-02-22 RX ADMIN — HYDROCHLOROTHIAZIDE 25 MG: 25 TABLET ORAL at 10:46

## 2022-02-22 RX ADMIN — SERTRALINE HYDROCHLORIDE 100 MG: 100 TABLET ORAL at 08:49

## 2022-02-22 RX ADMIN — CLONIDINE HYDROCHLORIDE 0.2 MG: 0.1 TABLET ORAL at 08:50

## 2022-02-22 RX ADMIN — LABETALOL HYDROCHLORIDE 10 MG: 5 INJECTION, SOLUTION INTRAVENOUS at 04:44

## 2022-02-22 RX ADMIN — MELOXICAM 15 MG: 7.5 TABLET ORAL at 08:53

## 2022-02-22 RX ADMIN — SODIUM CHLORIDE 100 ML/HR: 0.9 INJECTION, SOLUTION INTRAVENOUS at 08:45

## 2022-02-22 RX ADMIN — LABETALOL HYDROCHLORIDE 10 MG: 5 INJECTION, SOLUTION INTRAVENOUS at 00:51

## 2022-02-22 RX ADMIN — LABETALOL HYDROCHLORIDE 10 MG: 5 INJECTION, SOLUTION INTRAVENOUS at 08:50

## 2022-02-22 RX ADMIN — ACETAMINOPHEN 650 MG: 325 TABLET ORAL at 08:50

## 2022-02-22 RX ADMIN — Medication 1000 UNITS: at 08:50

## 2022-02-22 RX ADMIN — ENOXAPARIN SODIUM 40 MG: 40 INJECTION SUBCUTANEOUS at 08:50

## 2022-02-22 NOTE — PLAN OF CARE
Problem: MOBILITY - ADULT  Goal: Maintain or return to baseline ADL function  Description: INTERVENTIONS:  -  Assess patient's ability to carry out ADLs; assess patient's baseline for ADL function and identify physical deficits which impact ability to perform ADLs (bathing, care of mouth/teeth, toileting, grooming, dressing, etc )  - Assess/evaluate cause of self-care deficits   - Assess range of motion  - Assess patient's mobility; develop plan if impaired  - Assess patient's need for assistive devices and provide as appropriate  - Encourage maximum independence but intervene and supervise when necessary  - Involve family in performance of ADLs  - Assess for home care needs following discharge   - Consider OT consult to assist with ADL evaluation and planning for discharge  - Provide patient education as appropriate  Outcome: Progressing  Goal: Maintains/Returns to pre admission functional level  Description: INTERVENTIONS:  - Perform BMAT or MOVE assessment daily    - Set and communicate daily mobility goal to care team and patient/family/caregiver  - Collaborate with rehabilitation services on mobility goals if consulted  - Perform Range of Motion - times a day  - Reposition patient every - hours    - Dangle patient - times a day  - Stand patient - times a day  - Ambulate patient - times a day  - Out of bed to chair - times a day   - Out of bed for meals - times a day  - Out of bed for toileting  - Record patient progress and toleration of activity level   Outcome: Progressing     Problem: Potential for Falls  Goal: Patient will remain free of falls  Description: INTERVENTIONS:  - Educate patient/family on patient safety including physical limitations  - Instruct patient to call for assistance with activity   - Consult OT/PT to assist with strengthening/mobility   - Keep Call bell within reach  - Keep bed low and locked with side rails adjusted as appropriate  - Keep care items and personal belongings within reach  - Initiate and maintain comfort rounds  - Make Fall Risk Sign visible to staff  - Offer Toileting every - Hours, in advance of need  - Initiate/Maintain alarms  - Obtain necessary fall risk management equipment  - Apply yellow socks and bracelet for high fall risk patients  - Consider moving patient to room near nurses station  Outcome: Progressing     Problem: NEUROSENSORY - ADULT  Goal: Achieves stable or improved neurological status  Description: INTERVENTIONS  - Monitor and report changes in neurological status  - Monitor vital signs such as temperature, blood pressure, glucose, and any other labs ordered   - Initiate measures to prevent increased intracranial pressure  - Monitor for seizure activity and implement precautions if appropriate      Outcome: Progressing  Goal: Remains free of injury related to seizures activity  Description: INTERVENTIONS  - Maintain airway, patient safety  and administer oxygen as ordered  - Monitor patient for seizure activity, document and report duration and description of seizure to physician/advanced practitioner  - If seizure occurs,  ensure patient safety during seizure  - Reorient patient post seizure  - Seizure pads on all 4 side rails  - Instruct patient/family to notify RN of any seizure activity including if an aura is experienced  - Instruct patient/family to call for assistance with activity based on nursing assessment  - Administer anti-seizure medications if ordered    Outcome: Progressing  Goal: Achieves maximal functionality and self care  Description: INTERVENTIONS  - Monitor swallowing and airway patency with patient fatigue and changes in neurological status  - Encourage and assist patient to increase activity and self care     - Encourage visually impaired, hearing impaired and aphasic patients to use assistive/communication devices  Outcome: Progressing     Problem: PAIN - ADULT  Goal: Verbalizes/displays adequate comfort level or baseline comfort level  Description: Interventions:  - Encourage patient to monitor pain and request assistance  - Assess pain using appropriate pain scale  - Administer analgesics based on type and severity of pain and evaluate response  - Implement non-pharmacological measures as appropriate and evaluate response  - Consider cultural and social influences on pain and pain management  - Notify physician/advanced practitioner if interventions unsuccessful or patient reports new pain  Outcome: Progressing     Problem: INFECTION - ADULT  Goal: Absence or prevention of progression during hospitalization  Description: INTERVENTIONS:  - Assess and monitor for signs and symptoms of infection  - Monitor lab/diagnostic results  - Monitor all insertion sites, i e  indwelling lines, tubes, and drains  - Monitor endotracheal if appropriate and nasal secretions for changes in amount and color  - [place] appropriate cooling/warming therapies per order  - Administer medications as ordered  - Instruct and encourage patient and family to use good hand hygiene technique  - Identify and instruct in appropriate isolation precautions for identified infection/condition  Outcome: Progressing  Goal: Absence of fever/infection during neutropenic period  Description: INTERVENTIONS:  - Monitor WBC    Outcome: Progressing     Problem: SAFETY ADULT  Goal: Maintain or return to baseline ADL function  Description: INTERVENTIONS:  -  Assess patient's ability to carry out ADLs; assess patient's baseline for ADL function and identify physical deficits which impact ability to perform ADLs (bathing, care of mouth/teeth, toileting, grooming, dressing, etc )  - Assess/evaluate cause of self-care deficits   - Assess range of motion  - Assess patient's mobility; develop plan if impaired  - Assess patient's need for assistive devices and provide as appropriate  - Encourage maximum independence but intervene and supervise when necessary  - Involve family in performance of ADLs  - Assess for home care needs following discharge   - Consider OT consult to assist with ADL evaluation and planning for discharge  - Provide patient education as appropriate  Outcome: Progressing  Goal: Maintains/Returns to pre admission functional level  Description: INTERVENTIONS:  - Perform BMAT or MOVE assessment daily    - Set and communicate daily mobility goal to care team and patient/family/caregiver  - Collaborate with rehabilitation services on mobility goals if consulted  - Perform Range of Motion - times a day  - Reposition patient every - hours    - Dangle patient - times a day  - Stand patient - times a day  - Ambulate patient - times a day  - Out of bed to chair - times a day   - Out of bed for meals - times a day  - Out of bed for toileting  - Record patient progress and toleration of activity level   Outcome: Progressing  Goal: Patient will remain free of falls  Description: INTERVENTIONS:  - Educate patient/family on patient safety including physical limitations  - Instruct patient to call for assistance with activity   - Consult OT/PT to assist with strengthening/mobility   - Keep Call bell within reach  - Keep bed low and locked with side rails adjusted as appropriate  - Keep care items and personal belongings within reach  - Initiate and maintain comfort rounds  - Make Fall Risk Sign visible to staff  - Offer Toileting every - Hours, in advance of need  - Initiate/Maintain alarms  - Obtain necessary fall risk management equipment  - Apply yellow socks and bracelet for high fall risk patients  - Consider moving patient to room near nurses station  Outcome: Progressing     Problem: DISCHARGE PLANNING  Goal: Discharge to home or other facility with appropriate resources  Description: INTERVENTIONS:  - Identify barriers to discharge w/patient and caregiver  - Arrange for needed discharge resources and transportation as appropriate  - Identify discharge learning needs (meds, wound care, etc )  - Arrange for interpretive services to assist at discharge as needed  - Refer to Case Management Department for coordinating discharge planning if the patient needs post-hospital services based on physician/advanced practitioner order or complex needs related to functional status, cognitive ability, or social support system  Outcome: Progressing     Problem: Knowledge Deficit  Goal: Patient/family/caregiver demonstrates understanding of disease process, treatment plan, medications, and discharge instructions  Description: Complete learning assessment and assess knowledge base    Interventions:  - Provide teaching at level of understanding  - Provide teaching via preferred learning methods  Outcome: Progressing     Problem: CARDIOVASCULAR - ADULT  Goal: Absence of cardiac dysrhythmias or at baseline rhythm  Description: INTERVENTIONS:  - Continuous cardiac monitoring, vital signs, obtain 12 lead EKG if ordered  - Administer antiarrhythmic and heart rate control medications as ordered  - Monitor electrolytes and administer replacement therapy as ordered  Outcome: Progressing

## 2022-02-22 NOTE — DISCHARGE SUMMARY
[address]  Discharge- Jaycob Cross 1975, 55 y o  female MRN: [id number]  Unit/Bed#: -01 Encounter: [id number]  Primary Care Provider: Dede Moritz, DO   Date and time admitted to hospital: 2/20/2022 10:43 PM    * Hypertensive crisis  Assessment & Plan  · /110 on ED admission  · CT brain no acute abnormality  · Blood pressure improved with hydralazine  · Patient has history of hypertension described as white coat and is not Rx antihypertensive medication  · In the past week patient underwent lumbar epidural injection by Pain Management on 02/15/2022 at [facility]  · /90 preprocedure, 187/108 postprocedure  · Questionable reaction to epidural steroid injection  · Patient was started on clonidine on admission  Unable to discontinue it at this time due to rebound blood pressure issues  Will place on clonidine 0 1 mg b i d  And also placed on HCTZ 25 mg daily  Repeat blood work in 1 week and outpatient follow-up with PCP and see if clonidine may be gradually titrated down    Acute onset of severe vertigo  Assessment & Plan  · POA with acute onset of severe vertigo that began after patient was lying on her back on the floor looking up at the TV and she rolled onto her stomach in an attempt to get up but when she rolled onto her stomach she developed intense vertigo, nausea and diaphoresis  · History of vestibular dysfunction, vertigo at age 25 and was treated with vestibular therapy  · Over the past years she intermittently is dizzy upon getting up rapidly from a sitting position but these symptoms are transient and brief  · Neurological exam is nonfocal with exception of inability to walk in a straight line  · Symptoms are exacerbated by any movement  · CT brain no acute abnormality  · Treated with Antivert and diazepam in the ED without improvement  · Clinically improving now  Continue Antivert on discharge      Class 3 severe obesity with serious comorbidity and body mass index (BMI) of 40 0 to 44 9 in adult [facility])  Assessment & Plan  · BMI 44 92  · Requires intensive lifestyle modification    Chronic lumbar radiculopathy  Assessment & Plan  · Followed by pain management  · Recent epidural steroid injection 02/15/2022  · Continue PTA Mobic, Zanaflex, tramadol  · Outpatient follow-up      Discharging Physician / Practitioner: Diana Queen MD  PCP: Min Cope DO  Admission Date:   Admission Orders (From admission, onward)     Ordered        02/21/22 0059  Place in Observation  Once                      Discharge Date: 02/22/22    Medical Problems             Resolved Problems  Date Reviewed: 2/22/2022    None                Consultations During Hospital Stay:  · None    Procedures Performed:   · None    Significant Findings / Test Results:   XR chest 1 view portable    Result Date: 2/21/2022  Impression: No acute cardiopulmonary disease  Workstation performed: [id number]     CT head without contrast    Result Date: 2/21/2022  Impression: No acute intracranial abnormality  Workstation performed: [id number]     Incidental Findings:   · None     Test Results Pending at Discharge (will require follow up): · None     Outpatient Tests Requested:  · BMP in 1 week    Complications:  none    Reason for Admission:  Dizziness    Hospital Course:     Gely Whyte is a 55 y o  female patient who originally presented to the hospital on 2/20/2022 due to acute dizziness/vertigo symptoms along with acute hypertensive urgency  Patient was initiated on meclizine Valium and also clonidine  Blood pressure is still elevated today and I did add HCTZ 25 mg daily following which blood pressure is improving and is down to 693 systolic  Will discharge on clonidine 0 1 mg b i d  And HCTZ 25 mg daily with further adjustment outpatient  Please try to gradually titrate off clonidine if possible    Please see above list of diagnoses and related plan for additional information       Condition at Discharge: good     Discharge Day Visit / Exam:     Subjective:  Patient denies any chest pain or shortness of breath or abdominal pain  Dizziness/vertigo symptoms are much better today  Vitals: Blood Pressure: 150/86 (02/22/22 1225)  Pulse: 98 (02/22/22 1225)  Temperature: 98 °F (36 7 °C) (02/22/22 0747)  Temp Source: Oral (02/21/22 1455)  Respirations: 18 (02/22/22 0747)  Height: 5' 4" (162 6 cm) (02/22/22 1100)  Weight - Scale: 118 kg (261 lb) (02/22/22 1100)  SpO2: 95 % (02/22/22 0850)  Exam:   Physical Exam  Vitals and nursing note reviewed  Constitutional:       Appearance: Normal appearance  HENT:      Head: Normocephalic and atraumatic  Right Ear: External ear normal       Left Ear: External ear normal       Nose: Nose normal       Mouth/Throat:      Pharynx: Oropharynx is clear  Cardiovascular:      Rate and Rhythm: Normal rate and regular rhythm  Heart sounds: Normal heart sounds  Pulmonary:      Effort: Pulmonary effort is normal       Breath sounds: Normal breath sounds  Abdominal:      General: Bowel sounds are normal       Palpations: Abdomen is soft  Tenderness: There is no abdominal tenderness  Musculoskeletal:         General: Normal range of motion  Cervical back: Normal range of motion and neck supple  Skin:     General: Skin is warm and dry  Capillary Refill: Capillary refill takes less than 2 seconds  Neurological:      General: No focal deficit present  Mental Status: She is alert and oriented to person, place, and time  Psychiatric:         Mood and Affect: Mood normal           Discussion with Family:  None    Discharge instructions/Information to patient and family:   See after visit summary for information provided to patient and family  Provisions for Follow-Up Care:  See after visit summary for information related to follow-up care and any pertinent home health orders        Disposition:     Home    For Discharges to [facility]:   · Not Applicable to this Patient - Not Applicable to this Patient    Planned Readmission:  None     Discharge Statement:  I spent 35 minutes discharging the patient  This time was spent on the day of discharge  I had direct contact with the patient on the day of discharge  Greater than 50% of the total time was spent examining patient, answering all patient questions, arranging and discussing plan of care with patient as well as directly providing post-discharge instructions  Additional time then spent on discharge activities  Discharge Medications:  See after visit summary for reconciled discharge medications provided to patient and family        ** Please Note: This note has been constructed using a voice recognition system **

## 2022-02-22 NOTE — UTILIZATION REVIEW
Initial Clinical Review    Admission: Date/Time/Statement:   Admission Orders (From admission, onward)     Ordered        02/21/22 0059  Place in Observation  Once                      Orders Placed This Encounter   Procedures    Place in Observation     Standing Status:   Standing     Number of Occurrences:   1     Order Specific Question:   Level of Care     Answer:   Med Surg [16]     ED Arrival Information     Expected Arrival Acuity    - 2/20/2022 22:42 Urgent         Means of arrival Escorted by Service Admission type    Ambulance [facility] Urgent         Arrival complaint    Dizziness        Chief Complaint   Patient presents with    Dizziness     dizziness started approx 30 min PTA, was laying on the floor watching TV, rolled over to get up and felt dizzy, became diaphoretic and nauseated  Hx of vertigo in the past         Initial Presentation: 55 y  o female W/PMHX: obesity life stye modification,  chronic lumbar radiculopathy s/p EPI steroid 2/15 C/W home regimen of meds  , Norma Gusman at age 25 treated with vestibular therapy, to ED from home via ems, admitted as Observation due to  Acute onset of severe vertigo, hypertensive crisis  Presented with vertigo, after lying on back rolled to stomach developed intense vertiog associated diaphoresis and nausea  Exam: Neurological exam is nonfocal and symptoms are truly exacerbated by movement  Inability to walk a straight line  Work up reveals Cardiac enzymes and EKG were nonischemic, CT brain no acute abnormality  Plan includes:neuro cks, telem, IVF hydration, antivert Q8H, PT eval , B/P c/w IV antihypertensives, and consider oral antihypertensives follow up w/ PCP at D/C  DVT  Prophy         Date and Time R Pupil Size (mm) L Pupil Size (mm) R Pupil Reaction L Pupil Reaction   02/22/22 0850 3 3 Brisk Brisk   02/22/22 0500 3 3 Brisk Brisk   02/22/22 0100 3 3 Brisk Brisk   02/21/22 1644 3 3 Brisk Brisk   02/21/22 0217 4 4 Brisk Brisk   02/20/22 2300 4 4 Brisk Brisk       Trauma Secondary Assessment - [place] Coma Scale    Date and Time Eye Opening Best Verbal Response Best Motor Response Radha Coma Scale Score   02/22/22 0850 4 5 6 15   02/22/22 0500 4 5 6 15   02/22/22 0100 4 5 6 15   02/21/22 1644 4 5 6 15   02/21/22 1529 4 5 6 15   02/21/22 0217 4 5 6 15   02/20/22 2300 4 5 6 15         ED Triage Vitals   Temperature Pulse Respirations Blood Pressure SpO2   02/20/22 2249 02/20/22 2249 02/20/22 2249 02/20/22 2249 02/20/22 2249   98 1 °F (36 7 °C) 86 16 (!) 220/110 99 %      Temp Source Heart Rate Source Patient Position - Orthostatic VS BP Location FiO2 (%)   02/20/22 2249 02/20/22 2249 02/20/22 2249 02/20/22 2249 --   Oral Monitor Lying Left arm       Pain Score       02/20/22 2300       2          Wt Readings from Last 1 Encounters:   02/21/22 119 kg (261 lb 11 oz)     Additional Vital Signs:   Date/Time Temp Pulse Resp BP MAP (mmHg) SpO2 O2 Device Patient Position - Orthostatic VS   02/22/22 0845 -- -- -- 160/90 -- -- -- Lying   02/22/22 07:47:04 98 °F (36 7 °C) 108 Abnormal  18 187/117 Abnormal  140 95 % -- --   02/22/22 0500 -- -- -- 142/87 -- -- -- --   02/22/22 04:34:13 -- 87 -- 142/87 105 94 % -- --   02/22/22 0100 -- -- -- -- -- -- None (Room air) --   02/22/22 00:48:54 -- 90 -- 147/76 100 96 % -- --   02/21/22 22:16:52 -- 90 -- 165/93 117 95 % -- --   02/21/22 19:20:27 98 5 °F (36 9 °C) 95 16 165/94 118 94 % -- --   02/21/22 1727 -- -- -- 176/107 Abnormal  -- -- -- Lying   02/21/22 1546 -- -- -- 174/106 Abnormal  -- -- -- Lying   02/21/22 15:29:39 98 8 °F (37 1 °C) 107 Abnormal  18 174/104 Abnormal  127 95 % None (Room air) --   02/21/22 15:11:44 98 9 °F (37 2 °C) 97 16 -- -- 95 % -- --   02/21/22 1455 98 °F (36 7 °C) 106 Abnormal  -- 181/89 Abnormal  -- 96 % None (Room air) Lying   02/21/22 1430 -- 96 19 188/94 Abnormal  127 97 % -- --   02/21/22 1330 -- 95 16 186/100 Abnormal  133 96 % None (Room air) Lying   02/21/22 1300 -- 96 19 178/101 Abnormal  132 99 % -- --   02/21/22 1230 -- 104 13 188/101 Abnormal  137 -- -- --   02/21/22 1100 -- 90 16 166/90 121 -- -- --   02/21/22 1030 -- 87 18 165/88 121 90 % -- --   02/21/22 1015 -- 85 17 161/93 120 -- -- --   02/21/22 1000 -- 92 19 165/99 126 92 % -- --   02/21/22 0945 -- 93 20 -- 132 85 % Abnormal  -- --   02/21/22 0915 -- 97 19 166/93 123 95 % None (Room air) Lying   02/21/22 0815 -- 104 22 213/96 Abnormal  138 93 % -- --   02/21/22 0715 -- 85 17 176/88 Abnormal  126 93 % -- --   02/21/22 0600 -- 83 17 176/93 Abnormal  126 93 % None (Room air) --   02/21/22 0500 -- 83 17 176/89 Abnormal  123 95 % None (Room air) Lying   02/21/22 0217 98 °F (36 7 °C) 89 18 184/105 Abnormal  137 96 % None (Room air) Lying   02/21/22 0115 -- 90 18 168/93 124 95 % None (Room air) Lying   02/21/22 0100 -- 83 16 171/94 Abnormal  126 95 % None (Room air) Lying   02/21/22 0030 -- 79 17 175/96 Abnormal  128 96 % -- Lying   02/21/22 0023 -- -- -- 177/100 Abnormal  -- -- -- --   02/21/22 0015 -- 83 17 183/107 Abnormal  139 96 % None (Room air) Lying   02/21/22 0005 -- -- -- 183/107 Abnormal  -- -- -- --       Pertinent Labs/Diagnostic Test Results:   CT head without contrast   Final Result by Juan Molina MD (02/21 0012)      No acute intracranial abnormality  XR chest 1 view portable   ED Interpretation by Smiley Tran DO (02/21 0018)   No active disease      Final Result by Joseph Atwood MD (02/21 0645)      No acute cardiopulmonary disease       2/ 21/22EKG: Normal sinus rhythm, Normal ECG, No previous ECGs available  2/22/22 ECHO: EF 70%      Results from last 7 days   Lab Units 02/21/22  0520 02/20/22  2314   WBC Thousand/uL 11 47* 12 05*   HEMOGLOBIN g/dL 14 4 14 5   HEMATOCRIT % 42 6 43 8   PLATELETS Thousands/uL 233 223   NEUTROS ABS Thousands/µL  --  9 00*         Results from last 7 days   Lab Units 02/21/22  0520 02/20/22  2314   SODIUM mmol/L 139 131*   POTASSIUM mmol/L 4 2 4 5   CHLORIDE mmol/L 104 101   CO2 mmol/L 25 27   ANION GAP mmol/L 10 3*   BUN mg/dL 17 23   CREATININE mg/dL 0 77 0 92   EGFR ml/min/1 73sq m 92 74   CALCIUM mg/dL 8 1* 8 5   MAGNESIUM mg/dL  --  2 1     Results from last 7 days   Lab Units 02/20/22  2314   AST U/L 11   ALT U/L 29   ALK PHOS U/L 61   TOTAL PROTEIN g/dL 7 4   ALBUMIN g/dL 3 6   TOTAL BILIRUBIN mg/dL 0 27         Results from last 7 days   Lab Units 02/21/22  0520 02/20/22  2314   GLUCOSE RANDOM mg/dL 120 129         Results from last 7 days   Lab Units 02/20/22  2314   HS TNI 0HR ng/L <2             Results from last 7 days   Lab Units 02/21/22  0520   TSH 3RD GENERATON uIU/mL 2 578         ED Treatment:   Medication Administration from 02/20/2022 2242 to 02/21/2022 1455       Date/Time Order Dose Route Action     02/20/2022 2338 sodium chloride 0 9 % bolus 1,000 mL 1,000 mL Intravenous New Bag     02/20/2022 2336 meclizine (ANTIVERT) tablet 25 mg 25 mg Oral Given     02/20/2022 2336 diazepam (VALIUM) injection 5 mg 5 mg Intravenous Given     02/21/2022 0023 hydrALAZINE (APRESOLINE) injection 5 mg 5 mg Intravenous Given     02/21/2022 0021 acetaminophen (TYLENOL) tablet 650 mg 650 mg Oral Given     02/21/2022 0211 ondansetron (ZOFRAN) injection 4 mg 4 mg Intravenous Given     02/21/2022 8484 sodium chloride 0 9 % infusion 100 mL/hr Intravenous New Bag     02/21/2022 0816 cholecalciferol (VITAMIN D3) tablet 1,000 Units 1,000 Units Oral Given     02/21/2022 0816 meloxicam (MOBIC) tablet 15 mg 15 mg Oral Given     02/21/2022 0816 sertraline (ZOLOFT) tablet 100 mg 100 mg Oral Given     02/21/2022 0510 metoprolol (LOPRESSOR) injection 5 mg 5 mg Intravenous Given     02/21/2022 1402 meclizine (ANTIVERT) tablet 25 mg 25 mg Oral Given     02/21/2022 0816 meclizine (ANTIVERT) tablet 25 mg 25 mg Oral Given     02/21/2022 1242 Labetalol HCl (NORMODYNE) injection 10 mg 10 mg Intravenous Given     02/21/2022 0910 Labetalol HCl (NORMODYNE) injection 10 mg 10 mg Intravenous Given     02/21/2022 1402 hydrALAZINE (APRESOLINE) injection 10 mg 10 mg Intravenous Given        Past Medical History:   Diagnosis Date    Anxiety     GERD (gastroesophageal reflux disease)     Herniated disc, cervical     Hypertension     Hypertriglyceridemia     Iron deficiency anemia     Vertigo     Vitamin D deficiency      Present on Admission:   Acute onset of severe vertigo   Hypertensive crisis   Chronic lumbar radiculopathy      Admitting Diagnosis: Dizziness [R42]  Vertigo [R42]  Hypertensive urgency [I16 0]  Age/Sex: 55 y o  female  Admission Orders:scds, telm, neuro cks,   Scheduled Medications:  cholecalciferol, 1,000 Units, Oral, Daily  cloNIDine, 0 2 mg, Oral, Q12H MISBAH  enoxaparin, 40 mg, Subcutaneous, Daily  Labetalol HCl, 10 mg, Intravenous, Q4H  meclizine, 25 mg, Oral, Q8H MISBAH  meloxicam, 15 mg, Oral, Daily  norgestrel-ethinyl estradiol, 1 tablet, Oral, Daily  sertraline, 100 mg, Oral, Daily      Continuous IV Infusions:     sodium chloride 0 9 % infusion  Rate: 100 mL/hr Dose: 100 mL/hr  Freq: Continuous Route: IV  Indications of Use: IV Hydration  Last Dose: 100 mL/hr (02/22/22 0845)  Start: 02/21/22 0215 End: 02/22/22 1015             PRN Meds:  acetaminophen, 650 mg, Oral, Q6H PRN2/21 x2, 2/22 x1  ALPRAZolam, 0 25 mg, Oral, BID PRN  hydrALAZINE, 10 mg, Intravenous, Q6H PRN 2/21 x1  ondansetron, 4 mg, Intravenous, Q6H PRN  tiZANidine, 4 mg, Oral, Q8H PRN  traMADol, 50 mg, Oral, Q6H PRN          Network Utilization Review Department  ATTENTION: Please call with any questions or concerns to [phone] and carefully listen to the prompts so that you are directed to the right person  All voicemails are confidential   Lynette Kussmaul all requests for admission clinical reviews, approved or denied determinations and any other requests to dedicated fax number below belonging to the campus where the patient is receiving treatment   List of dedicated fax numbers for the Facilities:  [address] FAX NUMBER   ADMISSION DENIALS (Administrative/Medical Necessity) [phone]   [address] (Maternity/NICU/Pediatrics) [address] [facility],[address]  [phone]   [address] [address] [place] [address] [address] [facility] [address] [facility] [phone]

## 2025-04-30 ENCOUNTER — HOSPITAL ENCOUNTER (EMERGENCY)
Facility: HOSPITAL | Age: 50
Discharge: HOME | End: 2025-04-30
Attending: EMERGENCY MEDICINE
Payer: COMMERCIAL

## 2025-04-30 VITALS
TEMPERATURE: 96 F | OXYGEN SATURATION: 99 % | SYSTOLIC BLOOD PRESSURE: 129 MMHG | DIASTOLIC BLOOD PRESSURE: 79 MMHG | WEIGHT: 243.39 LBS | HEART RATE: 98 BPM | BODY MASS INDEX: 41.78 KG/M2 | RESPIRATION RATE: 18 BRPM

## 2025-04-30 DIAGNOSIS — K52.9 GASTROENTERITIS: Primary | ICD-10-CM

## 2025-04-30 LAB
ALBUMIN SERPL BCG-MCNC: 4.7 G/DL (ref 3.5–5)
ALP SERPL-CCNC: 68 U/L (ref 34–104)
ALT SERPL W P-5'-P-CCNC: 16 U/L (ref 7–52)
ANION GAP SERPL CALCULATED.3IONS-SCNC: 9 MMOL/L (ref 4–13)
AST SERPL W P-5'-P-CCNC: 14 U/L (ref 13–39)
ATRIAL RATE: 90 BPM
B-HCG SERPL-ACNC: 0.9 MIU/ML (ref 0–5)
BASOPHILS # BLD MANUAL: 0 THOUSAND/UL (ref 0–0.1)
BASOPHILS NFR MAR MANUAL: 0 % (ref 0–1)
BILIRUB SERPL-MCNC: 0.84 MG/DL (ref 0.2–1)
BUN SERPL-MCNC: 19 MG/DL (ref 5–25)
CALCIUM SERPL-MCNC: 9.7 MG/DL (ref 8.4–10.2)
CHLORIDE SERPL-SCNC: 103 MMOL/L (ref 96–108)
CO2 SERPL-SCNC: 28 MMOL/L (ref 21–32)
CREAT SERPL-MCNC: 0.98 MG/DL (ref 0.6–1.3)
EOSINOPHIL # BLD MANUAL: 0 THOUSAND/UL (ref 0–0.4)
EOSINOPHIL NFR BLD MANUAL: 0 % (ref 0–6)
ERYTHROCYTE [DISTWIDTH] IN BLOOD BY AUTOMATED COUNT: 12.7 % (ref 11.6–15.1)
GFR SERPL CREATININE-BSD FRML MDRD: 67 ML/MIN/1.73SQ M
GIANT PLATELETS BLD QL SMEAR: PRESENT
GLUCOSE SERPL-MCNC: 147 MG/DL (ref 65–140)
HCT VFR BLD AUTO: 48.9 % (ref 34.8–46.1)
HGB BLD-MCNC: 16 G/DL (ref 11.5–15.4)
LIPASE SERPL-CCNC: 10 U/L (ref 11–82)
LYMPHOCYTES # BLD AUTO: 0.58 THOUSAND/UL (ref 0.6–4.47)
LYMPHOCYTES # BLD AUTO: 5 % (ref 14–44)
MCH RBC QN AUTO: 29.6 PG (ref 26.8–34.3)
MCHC RBC AUTO-ENTMCNC: 32.7 G/DL (ref 31.4–37.4)
MCV RBC AUTO: 91 FL (ref 82–98)
MONOCYTES # BLD AUTO: 0.48 THOUSAND/UL (ref 0–1.22)
MONOCYTES NFR BLD: 5 % (ref 4–12)
NEUTROPHILS # BLD MANUAL: 8.63 THOUSAND/UL (ref 1.85–7.62)
NEUTS BAND NFR BLD MANUAL: 7 % (ref 0–8)
NEUTS SEG NFR BLD AUTO: 82 % (ref 43–75)
P AXIS: 72 DEGREES
PLATELET # BLD AUTO: 169 THOUSANDS/UL (ref 149–390)
PLATELET BLD QL SMEAR: ADEQUATE
PLATELET CLUMP BLD QL SMEAR: PRESENT
PMV BLD AUTO: 10.6 FL (ref 8.9–12.7)
POTASSIUM SERPL-SCNC: 4.5 MMOL/L (ref 3.5–5.3)
PR INTERVAL: 156 MS
PROT SERPL-MCNC: 8 G/DL (ref 6.4–8.4)
QRS AXIS: 62 DEGREES
QRSD INTERVAL: 78 MS
QT INTERVAL: 358 MS
QTC INTERVAL: 437 MS
RBC # BLD AUTO: 5.4 MILLION/UL (ref 3.81–5.12)
RBC MORPH BLD: NORMAL
SODIUM SERPL-SCNC: 140 MMOL/L (ref 135–147)
T WAVE AXIS: 21 DEGREES
VARIANT LYMPHS # BLD AUTO: 1 %
VENTRICULAR RATE: 90 BPM
WBC # BLD AUTO: 9.7 THOUSAND/UL (ref 4.31–10.16)

## 2025-04-30 PROCEDURE — 83690 ASSAY OF LIPASE: CPT | Performed by: EMERGENCY MEDICINE

## 2025-04-30 PROCEDURE — 93005 ELECTROCARDIOGRAM TRACING: CPT

## 2025-04-30 PROCEDURE — 85007 BL SMEAR W/DIFF WBC COUNT: CPT | Performed by: EMERGENCY MEDICINE

## 2025-04-30 PROCEDURE — 96366 THER/PROPH/DIAG IV INF ADDON: CPT

## 2025-04-30 PROCEDURE — 99285 EMERGENCY DEPT VISIT HI MDM: CPT | Performed by: EMERGENCY MEDICINE

## 2025-04-30 PROCEDURE — 85027 COMPLETE CBC AUTOMATED: CPT | Performed by: EMERGENCY MEDICINE

## 2025-04-30 PROCEDURE — 36415 COLL VENOUS BLD VENIPUNCTURE: CPT | Performed by: EMERGENCY MEDICINE

## 2025-04-30 PROCEDURE — 96375 TX/PRO/DX INJ NEW DRUG ADDON: CPT

## 2025-04-30 PROCEDURE — 84702 CHORIONIC GONADOTROPIN TEST: CPT | Performed by: EMERGENCY MEDICINE

## 2025-04-30 PROCEDURE — 99284 EMERGENCY DEPT VISIT MOD MDM: CPT

## 2025-04-30 PROCEDURE — 80053 COMPREHEN METABOLIC PANEL: CPT | Performed by: EMERGENCY MEDICINE

## 2025-04-30 PROCEDURE — 96365 THER/PROPH/DIAG IV INF INIT: CPT

## 2025-04-30 RX ORDER — PANTOPRAZOLE SODIUM 40 MG/10ML
40 INJECTION, POWDER, LYOPHILIZED, FOR SOLUTION INTRAVENOUS ONCE
Status: COMPLETED | OUTPATIENT
Start: 2025-04-30 | End: 2025-04-30

## 2025-04-30 RX ORDER — ONDANSETRON 2 MG/ML
4 INJECTION INTRAMUSCULAR; INTRAVENOUS ONCE
Status: COMPLETED | OUTPATIENT
Start: 2025-04-30 | End: 2025-04-30

## 2025-04-30 RX ORDER — ONDANSETRON 4 MG/1
4 TABLET, FILM COATED ORAL EVERY 8 HOURS PRN
Qty: 12 TABLET | Refills: 0 | Status: SHIPPED | OUTPATIENT
Start: 2025-04-30

## 2025-04-30 RX ADMIN — PANTOPRAZOLE SODIUM 40 MG: 40 INJECTION, POWDER, FOR SOLUTION INTRAVENOUS at 07:55

## 2025-04-30 RX ADMIN — ONDANSETRON 4 MG: 2 INJECTION INTRAMUSCULAR; INTRAVENOUS at 07:55

## 2025-04-30 RX ADMIN — SODIUM CHLORIDE, SODIUM LACTATE, POTASSIUM CHLORIDE, AND CALCIUM CHLORIDE 2000 ML: .6; .31; .03; .02 INJECTION, SOLUTION INTRAVENOUS at 07:54

## 2025-04-30 NOTE — ED PROVIDER NOTES
Time reflects when diagnosis was documented in both MDM as applicable and the Disposition within this note       Time User Action Codes Description Comment    4/30/2025  8:58 AM Arnol John Add [K52.9] Gastroenteritis           ED Disposition       ED Disposition   Discharge    Condition   Stable    Date/Time   Wed Apr 30, 2025  8:58 AM    Comment   Elizabeth Tsangjacob discharge to home/self care.                   Assessment & Plan       Medical Decision Making  0734: Patient appears well, vital signs reviewed.  Benign abdominal exam.  Patient with gastroenteritis symptomatology.  Plan to complete basic labs including urinalysis.  I believe imaging will be of low yield, however will complete blood work and allow them to guide need for further diagnostics.  I will rehydrate with IV fluids, give Zofran and Protonix for her discomfort reevaluate.  Screening EKG.    0900: Labs reviewed.  Patient had significant relief with above therapy.  Stable for discharge.    Amount and/or Complexity of Data Reviewed  Labs: ordered.  ECG/medicine tests: ordered and independent interpretation performed.     Details: Normal sinus rhythm 90 bpm, no acute ischemia.    Risk  Prescription drug management.             Medications   lactated ringers bolus 2,000 mL (0 mL Intravenous Stopped 4/30/25 0951)   ondansetron (ZOFRAN) injection 4 mg (4 mg Intravenous Given 4/30/25 0755)   pantoprazole (PROTONIX) injection 40 mg (40 mg Intravenous Given 4/30/25 0755)       ED Risk Strat Scores                    No data recorded        SBIRT 22yo+      Flowsheet Row Most Recent Value   Initial Alcohol Screen: US AUDIT-C     1. How often do you have a drink containing alcohol? 0 Filed at: 04/30/2025 0812   2. How many drinks containing alcohol do you have on a typical day you are drinking?  0 Filed at: 04/30/2025 0812   3b. FEMALE Any Age, or MALE 65+: How often do you have 4 or more drinks on one occassion? 0 Filed at: 04/30/2025 0812   Audit-C  Score 0 Filed at: 2025   LITO: How many times in the past year have you...    Used an illegal drug or used a prescription medication for non-medical reasons? Never Filed at: 2025                            History of Present Illness       Chief Complaint   Patient presents with    Abdominal Pain     Patient brought in by EMS from home for abdominal pain with NVD. Son recently had stomach bug.        Past Medical History:   Diagnosis Date    Anxiety     GERD (gastroesophageal reflux disease)     Herniated disc, cervical     Hypertension     Hypertriglyceridemia     Iron deficiency anemia     Vertigo     Vitamin D deficiency       Past Surgical History:   Procedure Laterality Date     SECTION      FRACTURE SURGERY      Left orbital ORIF    LUMBAR EPIDURAL INJECTION  02/15/2022      Family History   Problem Relation Age of Onset    Hypertension Mother     Heart disease Mother 40    Diabetes Mother       Social History     Tobacco Use    Smoking status: Never    Smokeless tobacco: Never   Vaping Use    Vaping status: Never Used   Substance Use Topics    Alcohol use: Not Currently    Drug use: Not Currently      E-Cigarette/Vaping    E-Cigarette Use Never User       E-Cigarette/Vaping Substances      I have reviewed and agree with the history as documented.       History provided by:  Medical records and patient  Abdominal Pain  Pain location:  Epigastric  Pain quality: aching and cramping    Pain radiates to:  Does not radiate  Pain severity:  Moderate  Onset quality:  Gradual  Duration:  5 hours  Timing:  Constant  Progression:  Waxing and waning  Chronicity:  New  Context comment:  Patient reports nausea vomiting diarrhea and associated epigastric discomfort since 0230 this morning.  States she ate some buffalo dip that seemed bad last night.  Relieved by:  Nothing  Worsened by:  Nothing  Associated symptoms: diarrhea, nausea and vomiting    Associated symptoms: no anorexia, no belching,  no chest pain, no chills, no constipation, no cough, no dysuria, no fatigue, no fever, no flatus, no hematemesis, no hematochezia, no hematuria, no melena, no shortness of breath, no sore throat, no vaginal bleeding and no vaginal discharge    Risk factors comment:  Her son and several players on his baseball team were suffering from gastroenteritis recently      Review of Systems   Constitutional:  Negative for chills, fatigue and fever.   HENT:  Negative for ear discharge, ear pain, rhinorrhea and sore throat.    Eyes:  Negative for pain and visual disturbance.   Respiratory:  Negative for cough and shortness of breath.    Cardiovascular:  Negative for chest pain and palpitations.   Gastrointestinal:  Positive for abdominal pain, diarrhea, nausea and vomiting. Negative for abdominal distention, anal bleeding, anorexia, blood in stool, constipation, flatus, hematemesis, hematochezia, melena and rectal pain.   Endocrine: Negative for polydipsia, polyphagia and polyuria.   Genitourinary:  Negative for difficulty urinating, dysuria, flank pain, hematuria, vaginal bleeding and vaginal discharge.   Musculoskeletal:  Negative for arthralgias and back pain.   Skin:  Negative for color change and rash.   Allergic/Immunologic: Negative for immunocompromised state.   Neurological:  Negative for dizziness, seizures, syncope, weakness and headaches.   Psychiatric/Behavioral:  Negative for confusion and self-injury. The patient is not nervous/anxious.    All other systems reviewed and are negative.          Objective       ED Triage Vitals [04/30/25 0730]   Temperature Pulse Blood Pressure Respirations SpO2 Patient Position - Orthostatic VS   (!) 96 °F (35.6 °C) (!) 112 134/79 18 97 % Lying      Temp Source Heart Rate Source BP Location FiO2 (%) Pain Score    Temporal Monitor Left arm -- --      Vitals      Date and Time Temp Pulse SpO2 Resp BP Pain Score FACES Pain Rating User   04/30/25 0915 -- 98 99 % -- 129/79 -- -- BM    04/30/25 0900 -- 100 100 % 18 126/70 -- -- RR   04/30/25 0730 96 °F (35.6 °C) 112 97 % 18 134/79 -- -- AFG            Physical Exam  Vitals and nursing note reviewed.   Constitutional:       General: She is not in acute distress.     Appearance: Normal appearance. She is not ill-appearing, toxic-appearing or diaphoretic.   HENT:      Head: Normocephalic and atraumatic.      Nose: Nose normal. No congestion or rhinorrhea.      Mouth/Throat:      Mouth: Mucous membranes are moist.      Pharynx: Oropharynx is clear. No oropharyngeal exudate or posterior oropharyngeal erythema.   Eyes:      General:         Right eye: No discharge.         Left eye: No discharge.   Cardiovascular:      Rate and Rhythm: Normal rate and regular rhythm.      Pulses: Normal pulses.      Heart sounds: Normal heart sounds. No murmur heard.     No gallop.   Pulmonary:      Effort: Pulmonary effort is normal. No respiratory distress.      Breath sounds: Normal breath sounds. No stridor. No wheezing, rhonchi or rales.   Chest:      Chest wall: No tenderness.   Abdominal:      General: Bowel sounds are normal. There is no distension.      Palpations: Abdomen is soft. There is no mass.      Tenderness: There is abdominal tenderness in the epigastric area. There is no right CVA tenderness, left CVA tenderness, guarding or rebound.      Hernia: No hernia is present.   Musculoskeletal:         General: Normal range of motion.      Cervical back: Normal range of motion and neck supple.   Skin:     General: Skin is warm and dry.      Capillary Refill: Capillary refill takes less than 2 seconds.   Neurological:      General: No focal deficit present.      Mental Status: She is alert and oriented to person, place, and time.      Cranial Nerves: No cranial nerve deficit.      Sensory: No sensory deficit.      Motor: No weakness.      Coordination: Coordination normal.      Gait: Gait normal.      Deep Tendon Reflexes: Reflexes normal.   Psychiatric:          Mood and Affect: Mood normal.         Behavior: Behavior normal.         Thought Content: Thought content normal.         Judgment: Judgment normal.         Results Reviewed       Procedure Component Value Units Date/Time    Manual Differential(PHLEBS Do Not Order) [[id number]]  (Abnormal) Collected: 04/30/25 0755    Lab Status: Final result Specimen: Blood from Arm, Right Updated: 04/30/25 0906     Segmented % 82 %      Bands % 7 %      Lymphocytes % 5 %      Monocytes % 5 %      Eosinophils % 0 %      Basophils % 0 %      Atypical Lymphocytes % 1 %      Absolute Neutrophils 8.63 Thousand/uL      Absolute Lymphocytes 0.58 Thousand/uL      Absolute Monocytes 0.48 Thousand/uL      Absolute Eosinophils 0.00 Thousand/uL      Absolute Basophils 0.00 Thousand/uL      Total Counted --     RBC Morphology Normal     Platelet Estimate Adequate     Clumped Platelets Present     Giant PLTs Present    Quantitative hCG [[id number]]  (Normal) Collected: 04/30/25 0755    Lab Status: Final result Specimen: Blood from Arm, Right Updated: 04/30/25 0842     HCG, Quant 0.9 mIU/mL     Narrative:       Expected Ranges:    HCG results between 5.0 and 25.0 mIU/mL may be indicative of early pregnancy but should be interpreted in light of the total clinical presentation.    HCG can rise to detectable levels in enriqueta and post menopausal women (0-11.6 mIU/mL).     Approximate               Approximate HCG  Gestation age          Concentration ( mIU/mL)  _____________          ______________________   Weeks                      HCG values  0.2-1                       5-50  1-2                           2-3                         100-5000  3-4                         500-10136  4-5                         1000-88918  5-6                         [id number]  6-8                         [id number]  8-12                        [id number]      Comprehensive metabolic panel [[id number]]  (Abnormal) Collected: 04/30/25 0755    Lab Status:  Final result Specimen: Blood from Arm, Right Updated: 04/30/25 0819     Sodium 140 mmol/L      Potassium 4.5 mmol/L      Chloride 103 mmol/L      CO2 28 mmol/L      ANION GAP 9 mmol/L      BUN 19 mg/dL      Creatinine 0.98 mg/dL      Glucose 147 mg/dL      Calcium 9.7 mg/dL      AST 14 U/L      ALT 16 U/L      Alkaline Phosphatase 68 U/L      Total Protein 8.0 g/dL      Albumin 4.7 g/dL      Total Bilirubin 0.84 mg/dL      eGFR 67 ml/min/1.73sq m     Narrative:      [employer] guidelines for Chronic Kidney Disease (CKD):     Stage 1 with normal or high GFR (GFR > 90 mL/min/1.73 square meters)    Stage 2 Mild CKD (GFR = 60-89 mL/min/1.73 square meters)    Stage 3A Moderate CKD (GFR = 45-59 mL/min/1.73 square meters)    Stage 3B Moderate CKD (GFR = 30-44 mL/min/1.73 square meters)    Stage 4 Severe CKD (GFR = 15-29 mL/min/1.73 square meters)    Stage 5 End Stage CKD (GFR <15 mL/min/1.73 square meters)  Note: GFR calculation is accurate only with a steady state creatinine    Lipase [[id number]]  (Abnormal) Collected: 04/30/25 0755    Lab Status: Final result Specimen: Blood from Arm, Right Updated: 04/30/25 0819     Lipase 10 u/L     CBC and differential [[id number]]  (Abnormal) Collected: 04/30/25 0755    Lab Status: Final result Specimen: Blood from Arm, Right Updated: 04/30/25 0818     WBC 9.70 Thousand/uL      RBC 5.40 Million/uL      Hemoglobin 16.0 g/dL      Hematocrit 48.9 %      MCV 91 fL      MCH 29.6 pg      MCHC 32.7 g/dL      RDW 12.7 %      MPV 10.6 fL      Platelets 169 Thousands/uL     Narrative:      This is an appended report.  These results have been appended to a previously verified report.    UA w Reflex to Microscopic w Reflex to Culture [[id number]]     Lab Status: No result Specimen: Urine             No orders to display       Procedures    ED Medication and Procedure Management   Prior to Admission Medications   Prescriptions Last Dose Informant Patient Reported? Taking?    Cholecalciferol 250 MCG (71792 UT) CAPS   Yes No   Sig: Take 2,000 Units by mouth in the morning   cloNIDine (CATAPRES) 0.1 mg tablet   No No   Sig: Take 1 tablet (0.1 mg total) by mouth every 12 (twelve) hours   fluticasone (FLONASE) 50 mcg/act nasal spray   Yes No   Si spray into each nostril daily   hydrochlorothiazide (HYDRODIURIL) 25 mg tablet   No No   Sig: Take 1 tablet (25 mg total) by mouth daily   meclizine (ANTIVERT) 25 mg tablet   No No   Sig: Take 1 tablet (25 mg total) by mouth 3 (three) times a day as needed for dizziness   meloxicam (MOBIC) 15 mg tablet   Yes No   Sig: Take 15 mg by mouth daily   norgestrel-ethinyl estradiol (LO/OVRAL) 0.3 mg-30 mcg per tablet   Yes No   Sig: Take 1 tablet by mouth daily   sertraline (ZOLOFT) 100 mg tablet   Yes No   Sig: Take 100 mg by mouth daily   tiZANidine (ZANAFLEX) 2 mg tablet   Yes No   Sig: Take 4 mg by mouth every 8 (eight) hours as needed for muscle spasms   traMADol (ULTRAM) 50 mg tablet   Yes No   Sig: Take 50 mg by mouth every 6 (six) hours as needed for moderate pain or severe pain      Facility-Administered Medications: None     Discharge Medication List as of 2025  8:59 AM        START taking these medications    Details   ondansetron (ZOFRAN) 4 mg tablet Take 1 tablet (4 mg total) by mouth every 8 (eight) hours as needed for nausea, Starting 2025, Normal           CONTINUE these medications which have NOT CHANGED    Details   Cholecalciferol 250 MCG (87864 UT) CAPS Take 2,000 Units by mouth in the morning, Historical Med      cloNIDine (CATAPRES) 0.1 mg tablet Take 1 tablet (0.1 mg total) by mouth every 12 (twelve) hours, Starting 2022, Until Thu 3/24/2022, Normal      fluticasone (FLONASE) 50 mcg/act nasal spray 1 spray into each nostril daily, Historical Med      hydrochlorothiazide (HYDRODIURIL) 25 mg tablet Take 1 tablet (25 mg total) by mouth daily, Starting 2022, Until Fri 3/25/2022, Normal      meclizine  (ANTIVERT) 25 mg tablet Take 1 tablet (25 mg total) by mouth 3 (three) times a day as needed for dizziness, Starting Tue 2/22/2022, Until Thu 3/24/2022 at 2359, Normal      meloxicam (MOBIC) 15 mg tablet Take 15 mg by mouth daily, Historical Med      norgestrel-ethinyl estradiol (LO/OVRAL) 0.3 mg-30 mcg per tablet Take 1 tablet by mouth daily, Historical Med      sertraline (ZOLOFT) 100 mg tablet Take 100 mg by mouth daily, Historical Med      tiZANidine (ZANAFLEX) 2 mg tablet Take 4 mg by mouth every 8 (eight) hours as needed for muscle spasms, Historical Med      traMADol (ULTRAM) 50 mg tablet Take 50 mg by mouth every 6 (six) hours as needed for moderate pain or severe pain, Historical Med           No discharge procedures on file.  ED SEPSIS DOCUMENTATION   Time reflects when diagnosis was documented in both MDM as applicable and the Disposition within this note       Time User Action Codes Description Comment    4/30/2025  8:58 AM Arnol John Add [K52.9] Gastroenteritis                  Arnol John MD  04/30/25 1014